# Patient Record
Sex: FEMALE | Race: WHITE | NOT HISPANIC OR LATINO | Employment: FULL TIME | URBAN - METROPOLITAN AREA
[De-identification: names, ages, dates, MRNs, and addresses within clinical notes are randomized per-mention and may not be internally consistent; named-entity substitution may affect disease eponyms.]

---

## 2017-03-17 ENCOUNTER — TRANSCRIBE ORDERS (OUTPATIENT)
Dept: LAB | Facility: CLINIC | Age: 62
End: 2017-03-17

## 2017-03-17 ENCOUNTER — LAB (OUTPATIENT)
Dept: LAB | Facility: CLINIC | Age: 62
End: 2017-03-17
Payer: COMMERCIAL

## 2017-03-17 ENCOUNTER — HOSPITAL ENCOUNTER (OUTPATIENT)
Dept: RADIOLOGY | Facility: CLINIC | Age: 62
Discharge: HOME | End: 2017-03-17
Payer: COMMERCIAL

## 2017-03-17 DIAGNOSIS — E55.9 UNSPECIFIED VITAMIN D DEFICIENCY: ICD-10-CM

## 2017-03-17 DIAGNOSIS — C50.911 MALIGNANT NEOPLASM OF RIGHT FEMALE BREAST, UNSPECIFIED SITE OF BREAST: ICD-10-CM

## 2017-03-17 DIAGNOSIS — C50.911 MALIGNANT NEOPLASM OF RIGHT FEMALE BREAST, UNSPECIFIED SITE OF BREAST: Primary | ICD-10-CM

## 2017-03-17 DIAGNOSIS — E78.5 HYPERLIPIDEMIA, UNSPECIFIED HYPERLIPIDEMIA TYPE: ICD-10-CM

## 2017-03-17 DIAGNOSIS — K21.9 GASTROESOPHAGEAL REFLUX DISEASE WITHOUT ESOPHAGITIS: Primary | ICD-10-CM

## 2017-03-17 DIAGNOSIS — K21.9 GASTROESOPHAGEAL REFLUX DISEASE WITHOUT ESOPHAGITIS: ICD-10-CM

## 2017-03-17 LAB
25(OH)D3 SERPL-MCNC: 44.8 NG/ML (ref 30–100)
ALBUMIN SERPL BCP-MCNC: 3.8 G/DL (ref 3.5–5)
ALP SERPL-CCNC: 97 U/L (ref 46–116)
ALT SERPL W P-5'-P-CCNC: 31 U/L (ref 12–78)
ANION GAP SERPL CALCULATED.3IONS-SCNC: 6 MMOL/L (ref 4–13)
AST SERPL W P-5'-P-CCNC: 21 U/L (ref 5–45)
BACTERIA UR QL AUTO: ABNORMAL /HPF
BASOPHILS # BLD AUTO: 0.01 THOUSANDS/ΜL (ref 0–0.1)
BASOPHILS NFR BLD AUTO: 0 % (ref 0–1)
BILIRUB SERPL-MCNC: 0.4 MG/DL (ref 0.2–1)
BILIRUB UR QL STRIP: NEGATIVE
BUN SERPL-MCNC: 15 MG/DL (ref 5–25)
CALCIUM SERPL-MCNC: 8.7 MG/DL (ref 8.3–10.1)
CANCER AG27-29 SERPL-ACNC: 43.3 U/ML (ref 0–42.3)
CHLORIDE SERPL-SCNC: 105 MMOL/L (ref 100–108)
CHOLEST SERPL-MCNC: 152 MG/DL (ref 50–200)
CLARITY UR: CLEAR
CO2 SERPL-SCNC: 29 MMOL/L (ref 21–32)
COLOR UR: YELLOW
CREAT SERPL-MCNC: 0.92 MG/DL (ref 0.6–1.3)
EOSINOPHIL # BLD AUTO: 0.08 THOUSAND/ΜL (ref 0–0.61)
EOSINOPHIL NFR BLD AUTO: 2 % (ref 0–6)
ERYTHROCYTE [DISTWIDTH] IN BLOOD BY AUTOMATED COUNT: 13.8 % (ref 11.6–15.1)
GFR SERPL CREATININE-BSD FRML MDRD: >60 ML/MIN/1.73SQ M
GLUCOSE P FAST SERPL-MCNC: 92 MG/DL (ref 65–99)
GLUCOSE UR STRIP-MCNC: NEGATIVE MG/DL
HCT VFR BLD AUTO: 38.5 % (ref 34.8–46.1)
HDLC SERPL-MCNC: 56 MG/DL (ref 40–60)
HGB BLD-MCNC: 13 G/DL (ref 11.5–15.4)
HGB UR QL STRIP.AUTO: NEGATIVE
KETONES UR STRIP-MCNC: NEGATIVE MG/DL
LDLC SERPL CALC-MCNC: 57 MG/DL (ref 0–100)
LEUKOCYTE ESTERASE UR QL STRIP: ABNORMAL
LYMPHOCYTES # BLD AUTO: 1.95 THOUSANDS/ΜL (ref 0.6–4.47)
LYMPHOCYTES NFR BLD AUTO: 38 % (ref 14–44)
MCH RBC QN AUTO: 28.8 PG (ref 26.8–34.3)
MCHC RBC AUTO-ENTMCNC: 33.8 G/DL (ref 31.4–37.4)
MCV RBC AUTO: 85 FL (ref 82–98)
MONOCYTES # BLD AUTO: 0.32 THOUSAND/ΜL (ref 0.17–1.22)
MONOCYTES NFR BLD AUTO: 6 % (ref 4–12)
NEUTROPHILS # BLD AUTO: 2.73 THOUSANDS/ΜL (ref 1.85–7.62)
NEUTS SEG NFR BLD AUTO: 54 % (ref 43–75)
NITRITE UR QL STRIP: NEGATIVE
NON-SQ EPI CELLS URNS QL MICRO: ABNORMAL /HPF
PH UR STRIP.AUTO: 5 [PH] (ref 4.5–8)
PLATELET # BLD AUTO: 292 THOUSANDS/UL (ref 149–390)
PMV BLD AUTO: 9.9 FL (ref 8.9–12.7)
POTASSIUM SERPL-SCNC: 4.4 MMOL/L (ref 3.5–5.3)
PROT SERPL-MCNC: 7.1 G/DL (ref 6.4–8.2)
PROT UR STRIP-MCNC: NEGATIVE MG/DL
RBC # BLD AUTO: 4.52 MILLION/UL (ref 3.81–5.12)
RBC #/AREA URNS AUTO: ABNORMAL /HPF
SODIUM SERPL-SCNC: 140 MMOL/L (ref 136–145)
SP GR UR STRIP.AUTO: 1.02 (ref 1–1.03)
TRIGL SERPL-MCNC: 197 MG/DL
TSH SERPL DL<=0.05 MIU/L-ACNC: 2.37 UIU/ML (ref 0.36–3.74)
UROBILINOGEN UR QL STRIP.AUTO: 0.2 E.U./DL
WBC # BLD AUTO: 5.09 THOUSAND/UL (ref 4.31–10.16)
WBC #/AREA URNS AUTO: ABNORMAL /HPF

## 2017-03-17 PROCEDURE — 36415 COLL VENOUS BLD VENIPUNCTURE: CPT

## 2017-03-17 PROCEDURE — 80053 COMPREHEN METABOLIC PANEL: CPT

## 2017-03-17 PROCEDURE — 71020 HB CHEST X-RAY 2VW FRONTAL&LATL: CPT

## 2017-03-17 PROCEDURE — 85025 COMPLETE CBC W/AUTO DIFF WBC: CPT

## 2017-03-17 PROCEDURE — 86300 IMMUNOASSAY TUMOR CA 15-3: CPT

## 2017-03-17 PROCEDURE — 84443 ASSAY THYROID STIM HORMONE: CPT

## 2017-03-17 PROCEDURE — 81001 URINALYSIS AUTO W/SCOPE: CPT

## 2017-03-17 PROCEDURE — 80061 LIPID PANEL: CPT

## 2017-03-17 PROCEDURE — 82306 VITAMIN D 25 HYDROXY: CPT

## 2017-03-30 ENCOUNTER — TRANSCRIBE ORDERS (OUTPATIENT)
Dept: ADMINISTRATIVE | Facility: HOSPITAL | Age: 62
End: 2017-03-30

## 2017-03-30 DIAGNOSIS — R91.1 LUNG NODULE: Primary | ICD-10-CM

## 2017-04-07 ENCOUNTER — HOSPITAL ENCOUNTER (OUTPATIENT)
Dept: CT IMAGING | Facility: HOSPITAL | Age: 62
Discharge: HOME | End: 2017-04-07
Payer: COMMERCIAL

## 2017-04-07 DIAGNOSIS — R91.1 LUNG NODULE: ICD-10-CM

## 2017-04-07 PROCEDURE — 71260 CT THORAX DX C+: CPT

## 2017-04-07 RX ADMIN — IOHEXOL 85 ML: 350 INJECTION, SOLUTION INTRAVENOUS at 06:47

## 2017-05-19 ENCOUNTER — ALLSCRIPTS OFFICE VISIT (OUTPATIENT)
Dept: OTHER | Facility: OTHER | Age: 62
End: 2017-05-19

## 2017-11-04 ENCOUNTER — ALLSCRIPTS OFFICE VISIT (OUTPATIENT)
Dept: OTHER | Facility: OTHER | Age: 62
End: 2017-11-04

## 2017-11-08 LAB
HPV 18 (HISTORICAL): NOT DETECTED
HPV HIGH RISK 16/18 (HISTORICAL): NOT DETECTED
HPV16 (HISTORICAL): NOT DETECTED
PAP (HISTORICAL): NORMAL

## 2018-01-13 VITALS
BODY MASS INDEX: 24.66 KG/M2 | RESPIRATION RATE: 16 BRPM | HEART RATE: 100 BPM | TEMPERATURE: 98.6 F | DIASTOLIC BLOOD PRESSURE: 76 MMHG | OXYGEN SATURATION: 100 % | HEIGHT: 62 IN | SYSTOLIC BLOOD PRESSURE: 112 MMHG | WEIGHT: 134 LBS

## 2018-01-14 VITALS
HEIGHT: 63 IN | DIASTOLIC BLOOD PRESSURE: 80 MMHG | SYSTOLIC BLOOD PRESSURE: 138 MMHG | WEIGHT: 136 LBS | BODY MASS INDEX: 24.1 KG/M2

## 2018-03-06 ENCOUNTER — TRANSCRIBE ORDERS (OUTPATIENT)
Dept: ADMINISTRATIVE | Facility: HOSPITAL | Age: 63
End: 2018-03-06

## 2018-03-06 DIAGNOSIS — M25.512 LEFT SHOULDER PAIN, UNSPECIFIED CHRONICITY: Primary | ICD-10-CM

## 2018-03-16 ENCOUNTER — HOSPITAL ENCOUNTER (OUTPATIENT)
Dept: MRI IMAGING | Facility: HOSPITAL | Age: 63
Discharge: HOME | End: 2018-03-16
Payer: COMMERCIAL

## 2018-03-16 DIAGNOSIS — M25.512 LEFT SHOULDER PAIN, UNSPECIFIED CHRONICITY: ICD-10-CM

## 2018-03-16 PROCEDURE — 73221 MRI JOINT UPR EXTREM W/O DYE: CPT

## 2018-04-06 ENCOUNTER — TRANSCRIBE ORDERS (OUTPATIENT)
Dept: LAB | Facility: CLINIC | Age: 63
End: 2018-04-06

## 2018-04-06 ENCOUNTER — APPOINTMENT (OUTPATIENT)
Dept: RADIOLOGY | Facility: CLINIC | Age: 63
End: 2018-04-06
Payer: COMMERCIAL

## 2018-04-06 ENCOUNTER — APPOINTMENT (OUTPATIENT)
Dept: LAB | Facility: CLINIC | Age: 63
End: 2018-04-06
Payer: COMMERCIAL

## 2018-04-06 DIAGNOSIS — E55.9 AVITAMINOSIS D: Primary | ICD-10-CM

## 2018-04-06 DIAGNOSIS — Z13.9 SCREENING FOR CONDITION: ICD-10-CM

## 2018-04-06 DIAGNOSIS — C50.911 MALIGNANT NEOPLASM OF RIGHT FEMALE BREAST, UNSPECIFIED ESTROGEN RECEPTOR STATUS, UNSPECIFIED SITE OF BREAST (HCC): Primary | ICD-10-CM

## 2018-04-06 DIAGNOSIS — C50.911 MALIGNANT NEOPLASM OF RIGHT FEMALE BREAST, UNSPECIFIED ESTROGEN RECEPTOR STATUS, UNSPECIFIED SITE OF BREAST (HCC): ICD-10-CM

## 2018-04-06 DIAGNOSIS — E55.9 AVITAMINOSIS D: ICD-10-CM

## 2018-04-06 LAB
25(OH)D3 SERPL-MCNC: 72.4 NG/ML (ref 30–100)
ALBUMIN SERPL BCP-MCNC: 3.9 G/DL (ref 3.5–5)
ALP SERPL-CCNC: 103 U/L (ref 46–116)
ALT SERPL W P-5'-P-CCNC: 50 U/L (ref 12–78)
ANION GAP SERPL CALCULATED.3IONS-SCNC: 9 MMOL/L (ref 4–13)
AST SERPL W P-5'-P-CCNC: 26 U/L (ref 5–45)
BACTERIA UR QL AUTO: ABNORMAL /HPF
BILIRUB SERPL-MCNC: 0.6 MG/DL (ref 0.2–1)
BILIRUB UR QL STRIP: NEGATIVE
BUN SERPL-MCNC: 24 MG/DL (ref 5–25)
CALCIUM SERPL-MCNC: 8.9 MG/DL (ref 8.3–10.1)
CANCER AG27-29 SERPL-ACNC: 45.2 U/ML (ref 0–42.3)
CHLORIDE SERPL-SCNC: 104 MMOL/L (ref 100–108)
CHOLEST SERPL-MCNC: 166 MG/DL (ref 50–200)
CLARITY UR: CLEAR
CO2 SERPL-SCNC: 28 MMOL/L (ref 21–32)
COLOR UR: YELLOW
CREAT SERPL-MCNC: 0.86 MG/DL (ref 0.6–1.3)
ERYTHROCYTE [DISTWIDTH] IN BLOOD BY AUTOMATED COUNT: 13.7 % (ref 11.6–15.1)
GFR SERPL CREATININE-BSD FRML MDRD: 73 ML/MIN/1.73SQ M
GLUCOSE P FAST SERPL-MCNC: 93 MG/DL (ref 65–99)
GLUCOSE UR STRIP-MCNC: NEGATIVE MG/DL
HCT VFR BLD AUTO: 38.7 % (ref 34.8–46.1)
HDLC SERPL-MCNC: 49 MG/DL (ref 40–60)
HGB BLD-MCNC: 12.9 G/DL (ref 11.5–15.4)
HGB UR QL STRIP.AUTO: NEGATIVE
KETONES UR STRIP-MCNC: NEGATIVE MG/DL
LDLC SERPL CALC-MCNC: 92 MG/DL (ref 0–100)
LEUKOCYTE ESTERASE UR QL STRIP: ABNORMAL
MCH RBC QN AUTO: 28.4 PG (ref 26.8–34.3)
MCHC RBC AUTO-ENTMCNC: 33.3 G/DL (ref 31.4–37.4)
MCV RBC AUTO: 85 FL (ref 82–98)
NITRITE UR QL STRIP: NEGATIVE
NON-SQ EPI CELLS URNS QL MICRO: ABNORMAL /HPF
NONHDLC SERPL-MCNC: 117 MG/DL
OTHER STN SPEC: ABNORMAL
PH UR STRIP.AUTO: 6 [PH] (ref 4.5–8)
PLATELET # BLD AUTO: 310 THOUSANDS/UL (ref 149–390)
PMV BLD AUTO: 9.6 FL (ref 8.9–12.7)
POTASSIUM SERPL-SCNC: 3.8 MMOL/L (ref 3.5–5.3)
PROT SERPL-MCNC: 7.2 G/DL (ref 6.4–8.2)
PROT UR STRIP-MCNC: NEGATIVE MG/DL
RBC # BLD AUTO: 4.55 MILLION/UL (ref 3.81–5.12)
RBC #/AREA URNS AUTO: ABNORMAL /HPF
SODIUM SERPL-SCNC: 141 MMOL/L (ref 136–145)
SP GR UR STRIP.AUTO: 1.01 (ref 1–1.03)
TRIGL SERPL-MCNC: 123 MG/DL
TSH SERPL DL<=0.05 MIU/L-ACNC: 2.51 UIU/ML (ref 0.36–3.74)
UROBILINOGEN UR QL STRIP.AUTO: 0.2 E.U./DL
WBC # BLD AUTO: 5.22 THOUSAND/UL (ref 4.31–10.16)
WBC #/AREA URNS AUTO: ABNORMAL /HPF

## 2018-04-06 PROCEDURE — 86300 IMMUNOASSAY TUMOR CA 15-3: CPT

## 2018-04-06 PROCEDURE — 36415 COLL VENOUS BLD VENIPUNCTURE: CPT

## 2018-04-06 PROCEDURE — 80053 COMPREHEN METABOLIC PANEL: CPT

## 2018-04-06 PROCEDURE — 81001 URINALYSIS AUTO W/SCOPE: CPT | Performed by: FAMILY MEDICINE

## 2018-04-06 PROCEDURE — 80061 LIPID PANEL: CPT

## 2018-04-06 PROCEDURE — 82306 VITAMIN D 25 HYDROXY: CPT

## 2018-04-06 PROCEDURE — 85027 COMPLETE CBC AUTOMATED: CPT

## 2018-04-06 PROCEDURE — 84443 ASSAY THYROID STIM HORMONE: CPT

## 2018-04-06 PROCEDURE — 71046 X-RAY EXAM CHEST 2 VIEWS: CPT

## 2018-05-18 ENCOUNTER — OFFICE VISIT (OUTPATIENT)
Dept: HEMATOLOGY ONCOLOGY | Facility: CLINIC | Age: 63
End: 2018-05-18
Payer: COMMERCIAL

## 2018-05-18 VITALS
OXYGEN SATURATION: 97 % | HEART RATE: 97 BPM | HEIGHT: 63 IN | RESPIRATION RATE: 16 BRPM | SYSTOLIC BLOOD PRESSURE: 120 MMHG | WEIGHT: 136.4 LBS | TEMPERATURE: 98.1 F | DIASTOLIC BLOOD PRESSURE: 78 MMHG | BODY MASS INDEX: 24.17 KG/M2

## 2018-05-18 DIAGNOSIS — C50.911 MALIGNANT NEOPLASM OF RIGHT BREAST IN FEMALE, ESTROGEN RECEPTOR POSITIVE, UNSPECIFIED SITE OF BREAST (HCC): Primary | ICD-10-CM

## 2018-05-18 DIAGNOSIS — Z17.0 MALIGNANT NEOPLASM OF RIGHT BREAST IN FEMALE, ESTROGEN RECEPTOR POSITIVE, UNSPECIFIED SITE OF BREAST (HCC): Primary | ICD-10-CM

## 2018-05-18 PROCEDURE — 99213 OFFICE O/P EST LOW 20 MIN: CPT | Performed by: INTERNAL MEDICINE

## 2018-05-18 RX ORDER — MULTIVITAMIN/IRON/FOLIC ACID 18MG-0.4MG
TABLET ORAL
COMMUNITY
End: 2019-01-01 | Stop reason: ALTCHOICE

## 2018-05-18 RX ORDER — ATORVASTATIN CALCIUM 10 MG/1
TABLET, FILM COATED ORAL
COMMUNITY
End: 2020-01-01 | Stop reason: HOSPADM

## 2018-05-18 RX ORDER — ACETAMINOPHEN 500 MG
TABLET ORAL
COMMUNITY
End: 2019-01-01 | Stop reason: ALTCHOICE

## 2018-05-18 RX ORDER — BUSPIRONE HYDROCHLORIDE 15 MG/1
15 TABLET ORAL DAILY
Refills: 3 | COMMUNITY
Start: 2018-03-24 | End: 2020-01-01 | Stop reason: HOSPADM

## 2018-05-18 RX ORDER — ANASTROZOLE 1 MG/1
1 TABLET ORAL DAILY
COMMUNITY
End: 2018-05-22 | Stop reason: SDUPTHER

## 2018-05-22 DIAGNOSIS — Z17.0 MALIGNANT NEOPLASM OF BREAST IN FEMALE, ESTROGEN RECEPTOR POSITIVE, UNSPECIFIED LATERALITY, UNSPECIFIED SITE OF BREAST (HCC): Primary | ICD-10-CM

## 2018-05-22 DIAGNOSIS — C50.919 MALIGNANT NEOPLASM OF BREAST IN FEMALE, ESTROGEN RECEPTOR POSITIVE, UNSPECIFIED LATERALITY, UNSPECIFIED SITE OF BREAST (HCC): Primary | ICD-10-CM

## 2018-05-22 RX ORDER — ANASTROZOLE 1 MG/1
TABLET ORAL
Qty: 30 TABLET | Refills: 11 | Status: SHIPPED | OUTPATIENT
Start: 2018-05-22 | End: 2019-01-01 | Stop reason: SDUPTHER

## 2018-07-27 ENCOUNTER — OFFICE VISIT (OUTPATIENT)
Dept: HEMATOLOGY ONCOLOGY | Facility: CLINIC | Age: 63
End: 2018-07-27
Payer: COMMERCIAL

## 2018-07-27 VITALS
RESPIRATION RATE: 16 BRPM | WEIGHT: 138 LBS | SYSTOLIC BLOOD PRESSURE: 138 MMHG | BODY MASS INDEX: 24.45 KG/M2 | OXYGEN SATURATION: 97 % | TEMPERATURE: 98.1 F | DIASTOLIC BLOOD PRESSURE: 84 MMHG | HEART RATE: 94 BPM | HEIGHT: 63 IN

## 2018-07-27 DIAGNOSIS — Z17.0 MALIGNANT NEOPLASM OF RIGHT BREAST IN FEMALE, ESTROGEN RECEPTOR POSITIVE, UNSPECIFIED SITE OF BREAST (HCC): Primary | ICD-10-CM

## 2018-07-27 DIAGNOSIS — C50.911 MALIGNANT NEOPLASM OF RIGHT BREAST IN FEMALE, ESTROGEN RECEPTOR POSITIVE, UNSPECIFIED SITE OF BREAST (HCC): Primary | ICD-10-CM

## 2018-07-27 PROCEDURE — 99213 OFFICE O/P EST LOW 20 MIN: CPT | Performed by: INTERNAL MEDICINE

## 2018-07-27 RX ORDER — MELOXICAM 7.5 MG/1
7.5 TABLET ORAL DAILY
COMMUNITY
End: 2019-01-01 | Stop reason: ALTCHOICE

## 2018-11-17 ENCOUNTER — ANNUAL EXAM (OUTPATIENT)
Dept: OBGYN CLINIC | Facility: CLINIC | Age: 63
End: 2018-11-17
Payer: COMMERCIAL

## 2018-11-17 VITALS
DIASTOLIC BLOOD PRESSURE: 72 MMHG | WEIGHT: 138 LBS | HEIGHT: 63 IN | BODY MASS INDEX: 24.45 KG/M2 | SYSTOLIC BLOOD PRESSURE: 122 MMHG

## 2018-11-17 DIAGNOSIS — Z01.419 ENCOUNTER FOR GYNECOLOGICAL EXAMINATION WITHOUT ABNORMAL FINDING: Primary | ICD-10-CM

## 2018-11-17 PROCEDURE — S0612 ANNUAL GYNECOLOGICAL EXAMINA: HCPCS | Performed by: OBSTETRICS & GYNECOLOGY

## 2018-11-17 NOTE — PATIENT INSTRUCTIONS
Wellness Visit for Adults   AMBULATORY CARE:   A wellness visit  is when you see your healthcare provider to get screened for health problems  You can also get advice on how to stay healthy  Write down your questions so you remember to ask them  Ask your healthcare provider how often you should have a wellness visit  What happens at a wellness visit:  Your healthcare provider will ask about your health, and your family history of health problems  This includes high blood pressure, heart disease, and cancer  He or she will ask if you have symptoms that concern you, if you smoke, and about your mood  You may also be asked about your intake of medicines, supplements, food, and alcohol  Any of the following may be done:  · Your weight  will be checked  Your height may also be checked so your body mass index (BMI) can be calculated  Your BMI shows if you are at a healthy weight  · Your blood pressure  and heart rate will be checked  Your temperature may also be checked  · Blood and urine tests  may be done  Blood tests may be done to check your cholesterol levels  Abnormal cholesterol levels increase your risk for heart disease and stroke  You may also need a blood or urine test to check for diabetes if you are at increased risk  Urine tests may be done to look for signs of an infection or kidney disease  · A physical exam  includes checking your heartbeat and lungs with a stethoscope  Your healthcare provider may also check your skin to look for sun damage  · Screening tests  may be recommended  A screening test is done to check for diseases that may not cause symptoms  The screening tests you may need depend on your age, gender, family history, and lifestyle habits  For example, colorectal screening may be recommended if you are 48years old or older  Screening tests you need if you are a woman:   · A Pap smear  is used to screen for cervical cancer   Pap smears are usually done every 3 to 5 years depending on your age  You may need them more often if you have had abnormal Pap smear test results in the past  Ask your healthcare provider how often you should have a Pap smear  · A mammogram  is an x-ray of your breasts to screen for breast cancer  Experts recommend mammograms every 2 years starting at age 48 years  You may need a mammogram at age 52 years or younger if you have an increased risk for breast cancer  Talk to your healthcare provider about when you should start having mammograms and how often you need them  Vaccines you may need:   · Get an influenza vaccine  every year  The influenza vaccine protects you from the flu  Several types of viruses cause the flu  The viruses change over time, so new vaccines are made each year  · Get a tetanus-diphtheria (Td) booster vaccine  every 10 years  This vaccine protects you against tetanus and diphtheria  Tetanus is a severe infection that may cause painful muscle spasms and lockjaw  Diphtheria is a severe bacterial infection that causes a thick covering in the back of your mouth and throat  · Get a human papillomavirus (HPV) vaccine  if you are female and aged 23 to 32 or male 23 to 24 and never received it  This vaccine protects you from HPV infection  HPV is the most common infection spread by sexual contact  HPV may also cause vaginal, penile, and anal cancers  · Get a pneumococcal vaccine  if you are aged 72 years or older  The pneumococcal vaccine is an injection given to protect you from pneumococcal disease  Pneumococcal disease is an infection caused by pneumococcal bacteria  The infection may cause pneumonia, meningitis, or an ear infection  · Get a shingles vaccine  if you are aged 61 or older, even if you have had shingles before  The shingles vaccine is an injection to protect you from the varicella-zoster virus  This is the same virus that causes chickenpox   Shingles is a painful rash that develops in people who had chickenpox or have been exposed to the virus  How to eat healthy:  My Plate is a model for planning healthy meals  It shows the types and amounts of foods that should go on your plate  Fruits and vegetables make up about half of your plate, and grains and protein make up the other half  A serving of dairy is included on the side of your plate  The amount of calories and serving sizes you need depends on your age, gender, weight, and height  Examples of healthy foods are listed below:  · Eat a variety of vegetables  such as dark green, red, and orange vegetables  You can also include canned vegetables low in sodium (salt) and frozen vegetables without added butter or sauces  · Eat a variety of fresh fruits , canned fruit in 100% juice, frozen fruit, and dried fruit  · Include whole grains  At least half of the grains you eat should be whole grains  Examples include whole-wheat bread, wheat pasta, brown rice, and whole-grain cereals such as oatmeal     · Eat a variety of protein foods such as seafood (fish and shellfish), lean meat, and poultry without skin (turkey and chicken)  Examples of lean meats include pork leg, shoulder, or tenderloin, and beef round, sirloin, tenderloin, and extra lean ground beef  Other protein foods include eggs and egg substitutes, beans, peas, soy products, nuts, and seeds  · Choose low-fat dairy products such as skim or 1% milk or low-fat yogurt, cheese, and cottage cheese  · Limit unhealthy fats  such as butter, hard margarine, and shortening  Exercise:  Exercise at least 30 minutes per day on most days of the week  Some examples of exercise include walking, biking, dancing, and swimming  You can also fit in more physical activity by taking the stairs instead of the elevator or parking farther away from stores  Include muscle strengthening activities 2 days each week  Regular exercise provides many health benefits   It helps you manage your weight, and decreases your risk for type 2 diabetes, heart disease, stroke, and high blood pressure  Exercise can also help improve your mood  Ask your healthcare provider about the best exercise plan for you  General health and safety guidelines:   · Do not smoke  Nicotine and other chemicals in cigarettes and cigars can cause lung damage  Ask your healthcare provider for information if you currently smoke and need help to quit  E-cigarettes or smokeless tobacco still contain nicotine  Talk to your healthcare provider before you use these products  · Limit alcohol  A drink of alcohol is 12 ounces of beer, 5 ounces of wine, or 1½ ounces of liquor  · Lose weight, if needed  Being overweight increases your risk of certain health conditions  These include heart disease, high blood pressure, type 2 diabetes, and certain types of cancer  · Protect your skin  Do not sunbathe or use tanning beds  Use sunscreen with a SPF 15 or higher  Apply sunscreen at least 15 minutes before you go outside  Reapply sunscreen every 2 hours  Wear protective clothing, hats, and sunglasses when you are outside  · Drive safely  Always wear your seatbelt  Make sure everyone in your car wears a seatbelt  A seatbelt can save your life if you are in an accident  Do not use your cell phone when you are driving  This could distract you and cause an accident  Pull over if you need to make a call or send a text message  · Practice safe sex  Use latex condoms if are sexually active and have more than one partner  Your healthcare provider may recommend screening tests for sexually transmitted infections (STIs)  · Wear helmets, lifejackets, and protective gear  Always wear a helmet when you ride a bike or motorcycle, go skiing, or play sports that could cause a head injury  Wear protective equipment when you play sports  Wear a lifejacket when you are on a boat or doing water sports    © 2017 [address] [employer] Information is for End User's use only and may not be sold, redistributed or otherwise used for commercial purposes  All illustrations and images included in CareNotes® are the copyrighted property of A D A M , Inc  or Mamadou Calderon  The above information is an  only  It is not intended as medical advice for individual conditions or treatments  Talk to your doctor, nurse or pharmacist before following any medical regimen to see if it is safe and effective for you  Thank you for enrolling in [facility]  Please follow the instructions below to securely access your online medical record  wiseri allows you to send messages to your doctor, view your test results, renew your prescriptions, schedule appointments, and more  [facility] uses Single Sign on (SSO) Technology for you to log in and access our [facility], including wiseri  No more remembering multiple user names and passwords! We are going to guide you through, step by step, to help you set up your [employer] account which will provide access to your [employer] account  How Do I Sign Up? 1  In your Internet browser, go to [email or web address]       2  Click on the   [employer] patient account and then click Dont have an                 Account? Create one now      3  Enter your demographic information and chose a user name (email address) and password  Think of one that is secure and easy to remember  Enter a Referral code if you have one (this is not your [employer] code ) Accept the Terms and Conditions and the Privacy Policy  4  Select your security questions that you will use to reset your password should you forget it  Click Submit  5  Enter your wiseri Activation Code exactly as it appears below  You will not need to use this code after you have completed the sign-up process  If you do not sign up before the expiration date, you must request a new code                           wiseri Activation Code: [email or web address]  Expires: 12/1/2018  9:55 AM    6  Confirm your email address  An email confirmation was sent to you  Please open that email and click Confirm your Email   You should then be redirected to our [facility] Single sign on page, where you will log on with the user name and password you created! Proceed to the [employer] Icon to view your personal health information  Additional Information  If you have questions, you can e-mail patient  [email or web address] or call [phone] to talk to our customer support staff  Remember, [employer] is NOT to be used for urgent needs  For medical emergencies, dial 911

## 2018-11-17 NOTE — PROGRESS NOTES
Assessment/Plan:    Normal annual gynecological exam  Pap smear done last year was normal with negative Co testing, D for this year  Continue to follow up with the breast surgeon and oncologist for the history of breast cancer       Problem List Items Addressed This Visit     Encounter for gynecological examination without abnormal finding - Primary            Subjective:      Patient ID: Selena Hernandez is a 59 y o  female  Here for annual exam - overall well - no vaginal bleeding or discharge - bowels and bladder normal and recent colonoscopy - still with f/u with breast oncologist and surgeon for breast cancer - up to date with blood work - dexascan 2016 and f/u with PCP         The following portions of the patient's history were reviewed and updated as appropriate:   She  has a past medical history of Breast CA ([facility] [address] ); GERD (gastroesophageal reflux disease); and Hyperlipidemia  She   Patient Active Problem List    Diagnosis Date Noted    Encounter for gynecological examination without abnormal finding 11/17/2018    Malignant neoplasm of right breast in female, estrogen receptor positive ([facility] [address] ) 05/18/2018     She  has a past surgical history that includes Mastectomy (Bilateral); Reconstruction breast w/ tram flap (Right); and Breast biopsy  Her family history includes Colon cancer in her sister; Diabetes in her father and mother; Heart disease in her father and mother; Hypertension in her mother and sister  She  reports that she has never smoked  She has never used smokeless tobacco  She reports that she does not use drugs  Her alcohol history is not on file    Current Outpatient Prescriptions   Medication Sig Dispense Refill    anastrozole (ARIMIDEX) 1 mg tablet TAKE ONE TABLET BY MOUTH EVERY DAY 30 tablet 11    atorvastatin (LIPITOR) 10 mg tablet Take by mouth      busPIRone (BUSPAR) 15 mg tablet Take 15 mg by mouth daily  3    calcium carbonate 1250 MG capsule Take 1,250 mg by mouth 2 (two) times a day with meals      Calcium Carbonate-Vit D-Min (CALCIUM 1200) 3469-3874 MG-UNIT CHEW Chew      DAILY MULTIPLE VITAMINS/IRON TABS Take by mouth      meloxicam (MOBIC) 7 5 mg tablet Take 7 5 mg by mouth daily       No current facility-administered medications for this visit  Current Outpatient Prescriptions on File Prior to Visit   Medication Sig    anastrozole (ARIMIDEX) 1 mg tablet TAKE ONE TABLET BY MOUTH EVERY DAY    atorvastatin (LIPITOR) 10 mg tablet Take by mouth    busPIRone (BUSPAR) 15 mg tablet Take 15 mg by mouth daily    calcium carbonate 1250 MG capsule Take 1,250 mg by mouth 2 (two) times a day with meals    Calcium Carbonate-Vit D-Min (CALCIUM 1200) 8816-3045 MG-UNIT CHEW Chew    DAILY MULTIPLE VITAMINS/IRON TABS Take by mouth    meloxicam (MOBIC) 7 5 mg tablet Take 7 5 mg by mouth daily     No current facility-administered medications on file prior to visit  She is allergic to percocet [oxycodone-acetaminophen] and vancomycin       Review of Systems   Constitutional: Negative for fatigue, fever and unexpected weight change  HENT: Negative for dental problem, mouth sores, nosebleeds, rhinorrhea, sinus pain, sinus pressure and sore throat  Eyes: Negative for pain, discharge and visual disturbance  Respiratory: Negative for cough, chest tightness, shortness of breath and wheezing  Cardiovascular: Negative for chest pain, palpitations and leg swelling  Gastrointestinal: Negative for blood in stool, constipation, diarrhea, nausea and vomiting  Endocrine: Negative for polydipsia  Genitourinary: Negative for difficulty urinating, dyspareunia, dysuria, menstrual problem, pelvic pain, urgency, vaginal discharge and vaginal pain  Musculoskeletal: Negative for arthralgias, back pain and joint swelling  Allergic/Immunologic: Negative for environmental allergies  Neurological: Negative for seizures, light-headedness and headaches     Hematological: Does not bruise/bleed easily  Psychiatric/Behavioral: Negative for sleep disturbance  The patient is not nervous/anxious  All other systems reviewed and are negative  Objective:      LMP 01/01/2011 (Approximate)          Physical Exam   Constitutional: She is oriented to person, place, and time  She appears well-developed and well-nourished  white female    HENT:   Head: Normocephalic and atraumatic  Neck: Normal range of motion  Neck supple  No thyromegaly present  Cardiovascular: Normal rate and regular rhythm  Pulmonary/Chest: Effort normal and breath sounds normal  No respiratory distress  She has no wheezes  She has no rales  She exhibits no tenderness  Breasts are status post mastectomy with augmentation of the right breast which is soft and nontender and well healed with some axillary lymphedema, the left breast is surgically absent with the incision well-healed and no palpable masses  The exam is nontender for both breast areas   Abdominal: Soft  She exhibits no distension and no mass  There is no tenderness  There is no rebound and no guarding  Genitourinary:   Genitourinary Comments: Normal female, no vulvar or vaginal lesions, vaginal atrophy and some redundancy is noted, cervix is grossly normal appearance, uterus is anterior, normal in size, shape and mobility, there are no adnexal masses the exam is nontender  Rectal exam reveals normal sphincter tone, no palpable masses and stool is guaiac negative   Neurological: She is alert and oriented to person, place, and time  Psychiatric: She has a normal mood and affect  Her behavior is normal    Vitals reviewed

## 2019-01-01 ENCOUNTER — TELEPHONE (OUTPATIENT)
Dept: HEMATOLOGY ONCOLOGY | Facility: CLINIC | Age: 64
End: 2019-01-01

## 2019-01-01 ENCOUNTER — DOCUMENTATION (OUTPATIENT)
Dept: HEMATOLOGY ONCOLOGY | Facility: CLINIC | Age: 64
End: 2019-01-01

## 2019-01-01 ENCOUNTER — TRANSCRIBE ORDERS (OUTPATIENT)
Dept: LAB | Facility: CLINIC | Age: 64
End: 2019-01-01

## 2019-01-01 ENCOUNTER — HOSPITAL ENCOUNTER (OUTPATIENT)
Dept: NUCLEAR MEDICINE | Facility: HOSPITAL | Age: 64
Discharge: HOME | End: 2019-11-15
Attending: INTERNAL MEDICINE
Payer: COMMERCIAL

## 2019-01-01 ENCOUNTER — HOSPITAL ENCOUNTER (OUTPATIENT)
Dept: INFUSION CENTER | Facility: CLINIC | Age: 64
Discharge: HOME | End: 2019-12-26
Payer: COMMERCIAL

## 2019-01-01 ENCOUNTER — APPOINTMENT (OUTPATIENT)
Dept: CT IMAGING | Facility: HOSPITAL | Age: 64
End: 2019-01-01
Attending: INTERNAL MEDICINE
Payer: COMMERCIAL

## 2019-01-01 ENCOUNTER — APPOINTMENT (OUTPATIENT)
Dept: LAB | Facility: CLINIC | Age: 64
End: 2019-01-01
Payer: COMMERCIAL

## 2019-01-01 ENCOUNTER — OFFICE VISIT (OUTPATIENT)
Dept: HEMATOLOGY ONCOLOGY | Facility: CLINIC | Age: 64
End: 2019-01-01
Payer: COMMERCIAL

## 2019-01-01 ENCOUNTER — HOSPITAL ENCOUNTER (OUTPATIENT)
Dept: NUCLEAR MEDICINE | Facility: HOSPITAL | Age: 64
Discharge: HOME | End: 2019-05-09
Attending: INTERNAL MEDICINE
Payer: COMMERCIAL

## 2019-01-01 ENCOUNTER — HOSPITAL ENCOUNTER (OUTPATIENT)
Dept: INFUSION CENTER | Facility: CLINIC | Age: 64
Discharge: HOME | End: 2019-12-27
Payer: COMMERCIAL

## 2019-01-01 ENCOUNTER — HOSPITAL ENCOUNTER (OUTPATIENT)
Dept: INFUSION CENTER | Facility: CLINIC | Age: 64
Discharge: HOME | End: 2019-12-18
Payer: COMMERCIAL

## 2019-01-01 ENCOUNTER — HOSPITAL ENCOUNTER (OUTPATIENT)
Dept: RADIOLOGY | Facility: HOSPITAL | Age: 64
Discharge: HOME | End: 2019-04-20
Attending: SURGERY
Payer: COMMERCIAL

## 2019-01-01 ENCOUNTER — TRANSCRIBE ORDERS (OUTPATIENT)
Dept: ADMINISTRATIVE | Facility: HOSPITAL | Age: 64
End: 2019-01-01

## 2019-01-01 VITALS
TEMPERATURE: 97.7 F | SYSTOLIC BLOOD PRESSURE: 126 MMHG | OXYGEN SATURATION: 98 % | HEART RATE: 132 BPM | WEIGHT: 137 LBS | RESPIRATION RATE: 18 BRPM | DIASTOLIC BLOOD PRESSURE: 68 MMHG | HEIGHT: 63 IN | BODY MASS INDEX: 24.27 KG/M2

## 2019-01-01 VITALS
OXYGEN SATURATION: 97 % | HEIGHT: 63 IN | BODY MASS INDEX: 24.45 KG/M2 | TEMPERATURE: 97.5 F | WEIGHT: 138 LBS | RESPIRATION RATE: 18 BRPM | SYSTOLIC BLOOD PRESSURE: 128 MMHG | DIASTOLIC BLOOD PRESSURE: 90 MMHG | HEART RATE: 102 BPM

## 2019-01-01 VITALS
RESPIRATION RATE: 16 BRPM | DIASTOLIC BLOOD PRESSURE: 80 MMHG | OXYGEN SATURATION: 95 % | TEMPERATURE: 99.2 F | HEART RATE: 109 BPM | SYSTOLIC BLOOD PRESSURE: 142 MMHG

## 2019-01-01 VITALS
TEMPERATURE: 97.7 F | SYSTOLIC BLOOD PRESSURE: 122 MMHG | DIASTOLIC BLOOD PRESSURE: 74 MMHG | OXYGEN SATURATION: 94 % | RESPIRATION RATE: 18 BRPM | HEART RATE: 110 BPM

## 2019-01-01 DIAGNOSIS — C50.911 MALIGNANT NEOPLASM OF RIGHT BREAST IN FEMALE, ESTROGEN RECEPTOR POSITIVE, UNSPECIFIED SITE OF BREAST (HCC): ICD-10-CM

## 2019-01-01 DIAGNOSIS — T45.1X5A CHEMOTHERAPY INDUCED NAUSEA AND VOMITING: ICD-10-CM

## 2019-01-01 DIAGNOSIS — Z17.0 MALIGNANT NEOPLASM OF RIGHT BREAST IN FEMALE, ESTROGEN RECEPTOR POSITIVE, UNSPECIFIED SITE OF BREAST (HCC): ICD-10-CM

## 2019-01-01 DIAGNOSIS — C78.7 LIVER METASTASIS (HCC): ICD-10-CM

## 2019-01-01 DIAGNOSIS — C50.911 MALIGNANT NEOPLASM OF RIGHT BREAST IN FEMALE, ESTROGEN RECEPTOR POSITIVE, UNSPECIFIED SITE OF BREAST (HCC): Primary | ICD-10-CM

## 2019-01-01 DIAGNOSIS — C50.919 MALIGNANT NEOPLASM OF BREAST IN FEMALE, ESTROGEN RECEPTOR POSITIVE, UNSPECIFIED LATERALITY, UNSPECIFIED SITE OF BREAST (HCC): ICD-10-CM

## 2019-01-01 DIAGNOSIS — Z17.0 MALIGNANT NEOPLASM OF RIGHT BREAST IN FEMALE, ESTROGEN RECEPTOR POSITIVE, UNSPECIFIED SITE OF BREAST (HCC): Primary | ICD-10-CM

## 2019-01-01 DIAGNOSIS — C50.911 MALIGNANT NEOPLASM OF RIGHT FEMALE BREAST, UNSPECIFIED ESTROGEN RECEPTOR STATUS, UNSPECIFIED SITE OF BREAST (HCC): Primary | ICD-10-CM

## 2019-01-01 DIAGNOSIS — Z17.0 MALIGNANT NEOPLASM OF BREAST IN FEMALE, ESTROGEN RECEPTOR POSITIVE, UNSPECIFIED LATERALITY, UNSPECIFIED SITE OF BREAST (HCC): ICD-10-CM

## 2019-01-01 DIAGNOSIS — C78.7 LIVER METASTASIS (HCC): Primary | ICD-10-CM

## 2019-01-01 DIAGNOSIS — E83.52 CALCIUM BLOOD INCREASED: ICD-10-CM

## 2019-01-01 DIAGNOSIS — C50.911 MALIGNANT NEOPLASM OF RIGHT FEMALE BREAST, UNSPECIFIED ESTROGEN RECEPTOR STATUS, UNSPECIFIED SITE OF BREAST (HCC): ICD-10-CM

## 2019-01-01 DIAGNOSIS — E83.52 CALCIUM BLOOD INCREASED: Primary | ICD-10-CM

## 2019-01-01 DIAGNOSIS — R11.2 CHEMOTHERAPY INDUCED NAUSEA AND VOMITING: ICD-10-CM

## 2019-01-01 DIAGNOSIS — I10 ESSENTIAL HYPERTENSION, MALIGNANT: Primary | ICD-10-CM

## 2019-01-01 DIAGNOSIS — E55.9 AVITAMINOSIS D: ICD-10-CM

## 2019-01-01 DIAGNOSIS — E78.00 PURE HYPERCHOLESTEROLEMIA: ICD-10-CM

## 2019-01-01 DIAGNOSIS — I10 ESSENTIAL HYPERTENSION, MALIGNANT: ICD-10-CM

## 2019-01-01 DIAGNOSIS — C50.511 MALIGNANT NEOPLASM OF LOWER-OUTER QUADRANT OF RIGHT FEMALE BREAST, UNSPECIFIED ESTROGEN RECEPTOR STATUS (HCC): ICD-10-CM

## 2019-01-01 LAB
25(OH)D3 SERPL-MCNC: 57.1 NG/ML (ref 30–100)
ALBUMIN SERPL BCP-MCNC: 2.4 G/DL (ref 3.5–5)
ALBUMIN SERPL BCP-MCNC: 2.6 G/DL (ref 3.5–5)
ALBUMIN SERPL BCP-MCNC: 3.8 G/DL (ref 3.5–5)
ALBUMIN SERPL BCP-MCNC: 3.9 G/DL (ref 3.5–5)
ALP SERPL-CCNC: 101 U/L (ref 46–116)
ALP SERPL-CCNC: 103 U/L (ref 46–116)
ALP SERPL-CCNC: 377 U/L (ref 46–116)
ALP SERPL-CCNC: 389 U/L (ref 46–116)
ALT SERPL W P-5'-P-CCNC: 121 U/L (ref 12–78)
ALT SERPL W P-5'-P-CCNC: 163 U/L (ref 12–78)
ALT SERPL W P-5'-P-CCNC: 50 U/L (ref 12–78)
ALT SERPL W P-5'-P-CCNC: 56 U/L (ref 12–78)
ANION GAP SERPL CALCULATED.3IONS-SCNC: 10 MMOL/L (ref 4–13)
ANION GAP SERPL CALCULATED.3IONS-SCNC: 11 MMOL/L (ref 4–13)
ANION GAP SERPL CALCULATED.3IONS-SCNC: 8 MMOL/L (ref 4–13)
ANION GAP SERPL CALCULATED.3IONS-SCNC: 9 MMOL/L (ref 4–13)
AST SERPL W P-5'-P-CCNC: 292 U/L (ref 5–45)
AST SERPL W P-5'-P-CCNC: 30 U/L (ref 5–45)
AST SERPL W P-5'-P-CCNC: 339 U/L (ref 5–45)
AST SERPL W P-5'-P-CCNC: 37 U/L (ref 5–45)
BACTERIA UR QL AUTO: NORMAL /HPF
BASOPHILS # BLD AUTO: 0.03 THOUSANDS/ΜL (ref 0–0.1)
BASOPHILS # BLD AUTO: 0.04 THOUSANDS/ΜL (ref 0–0.1)
BASOPHILS NFR BLD AUTO: 0 % (ref 0–1)
BASOPHILS NFR BLD AUTO: 0 % (ref 0–1)
BASOPHILS NFR BLD AUTO: 1 % (ref 0–1)
BASOPHILS NFR BLD AUTO: 1 % (ref 0–1)
BILIRUB SERPL-MCNC: 0.6 MG/DL (ref 0.2–1)
BILIRUB SERPL-MCNC: 0.6 MG/DL (ref 0.2–1)
BILIRUB SERPL-MCNC: 1.77 MG/DL (ref 0.2–1)
BILIRUB SERPL-MCNC: 3.67 MG/DL (ref 0.2–1)
BILIRUB UR QL STRIP: NEGATIVE
BUN SERPL-MCNC: 11 MG/DL (ref 5–25)
BUN SERPL-MCNC: 13 MG/DL (ref 5–25)
BUN SERPL-MCNC: 16 MG/DL (ref 5–25)
BUN SERPL-MCNC: 19 MG/DL (ref 5–25)
CALCIUM ALBUM COR SERPL-MCNC: 11.8 MG/DL (ref 8.3–10.1)
CALCIUM ALBUM COR SERPL-MCNC: 12.8 MG/DL (ref 8.3–10.1)
CALCIUM SERPL-MCNC: 10.7 MG/DL (ref 8.3–10.1)
CALCIUM SERPL-MCNC: 11.5 MG/DL (ref 8.3–10.1)
CALCIUM SERPL-MCNC: 9.2 MG/DL (ref 8.3–10.1)
CALCIUM SERPL-MCNC: 9.4 MG/DL (ref 8.3–10.1)
CANCER AG15-3 SERPL-ACNC: 202.7 U/ML (ref 0–25)
CANCER AG15-3 SERPL-ACNC: ABNORMAL U/ML (ref 0–25)
CANCER AG27-29 SERPL-ACNC: 6025.2 U/ML (ref 0–42.3)
CHLORIDE SERPL-SCNC: 103 MMOL/L (ref 100–108)
CHLORIDE SERPL-SCNC: 105 MMOL/L (ref 100–108)
CHLORIDE SERPL-SCNC: 106 MMOL/L (ref 100–108)
CHLORIDE SERPL-SCNC: 97 MMOL/L (ref 100–108)
CHOLEST SERPL-MCNC: 144 MG/DL (ref 50–200)
CLARITY UR: CLEAR
CO2 SERPL-SCNC: 22 MMOL/L (ref 21–32)
CO2 SERPL-SCNC: 26 MMOL/L (ref 21–32)
CO2 SERPL-SCNC: 27 MMOL/L (ref 21–32)
CO2 SERPL-SCNC: 27 MMOL/L (ref 21–32)
COLOR UR: YELLOW
CREAT SERPL-MCNC: 0.84 MG/DL (ref 0.6–1.3)
CREAT SERPL-MCNC: 0.94 MG/DL (ref 0.6–1.3)
CREAT SERPL-MCNC: 1.42 MG/DL (ref 0.6–1.3)
CREAT SERPL-MCNC: 1.59 MG/DL (ref 0.6–1.3)
EOSINOPHIL # BLD AUTO: 0.01 THOUSAND/ΜL (ref 0–0.61)
EOSINOPHIL # BLD AUTO: 0.02 THOUSAND/ΜL (ref 0–0.61)
EOSINOPHIL # BLD AUTO: 0.09 THOUSAND/ΜL (ref 0–0.61)
EOSINOPHIL # BLD AUTO: 0.11 THOUSAND/ΜL (ref 0–0.61)
EOSINOPHIL NFR BLD AUTO: 0 % (ref 0–6)
EOSINOPHIL NFR BLD AUTO: 0 % (ref 0–6)
EOSINOPHIL NFR BLD AUTO: 1 % (ref 0–6)
EOSINOPHIL NFR BLD AUTO: 2 % (ref 0–6)
ERYTHROCYTE [DISTWIDTH] IN BLOOD BY AUTOMATED COUNT: 13.5 % (ref 11.6–15.1)
ERYTHROCYTE [DISTWIDTH] IN BLOOD BY AUTOMATED COUNT: 13.9 % (ref 11.6–15.1)
ERYTHROCYTE [DISTWIDTH] IN BLOOD BY AUTOMATED COUNT: 16.4 % (ref 11.6–15.1)
ERYTHROCYTE [DISTWIDTH] IN BLOOD BY AUTOMATED COUNT: 18.6 % (ref 11.6–15.1)
GFR SERPL CREATININE-BSD FRML MDRD: 34 ML/MIN/1.73SQ M
GFR SERPL CREATININE-BSD FRML MDRD: 39 ML/MIN/1.73SQ M
GFR SERPL CREATININE-BSD FRML MDRD: 65 ML/MIN/1.73SQ M
GFR SERPL CREATININE-BSD FRML MDRD: 75 ML/MIN/1.73SQ M
GLUCOSE P FAST SERPL-MCNC: 86 MG/DL (ref 65–99)
GLUCOSE SERPL-MCNC: 105 MG/DL (ref 65–140)
GLUCOSE SERPL-MCNC: 117 MG/DL (ref 65–140)
GLUCOSE SERPL-MCNC: 136 MG/DL (ref 65–140)
GLUCOSE UR STRIP-MCNC: NEGATIVE MG/DL
HCT VFR BLD AUTO: 39.4 % (ref 34.8–46.1)
HCT VFR BLD AUTO: 41 % (ref 34.8–46.1)
HCT VFR BLD AUTO: 43.1 % (ref 34.8–46.1)
HCT VFR BLD AUTO: 47.1 % (ref 34.8–46.1)
HDLC SERPL-MCNC: 47 MG/DL (ref 40–60)
HGB BLD-MCNC: 13.4 G/DL (ref 11.5–15.4)
HGB BLD-MCNC: 14 G/DL (ref 11.5–15.4)
HGB BLD-MCNC: 14.7 G/DL (ref 11.5–15.4)
HGB BLD-MCNC: 16.5 G/DL (ref 11.5–15.4)
HGB UR QL STRIP.AUTO: NEGATIVE
IMM GRANULOCYTES # BLD AUTO: 0.01 THOUSAND/UL (ref 0–0.2)
IMM GRANULOCYTES # BLD AUTO: 0.02 THOUSAND/UL (ref 0–0.2)
IMM GRANULOCYTES # BLD AUTO: 0.02 THOUSAND/UL (ref 0–0.2)
IMM GRANULOCYTES # BLD AUTO: 0.04 THOUSAND/UL (ref 0–0.2)
IMM GRANULOCYTES NFR BLD AUTO: 0 % (ref 0–2)
IMM GRANULOCYTES NFR BLD AUTO: 1 % (ref 0–2)
KETONES UR STRIP-MCNC: NEGATIVE MG/DL
LDLC SERPL CALC-MCNC: 71 MG/DL (ref 0–100)
LEUKOCYTE ESTERASE UR QL STRIP: NEGATIVE
LYMPHOCYTES # BLD AUTO: 1.28 THOUSANDS/ΜL (ref 0.6–4.47)
LYMPHOCYTES # BLD AUTO: 1.3 THOUSANDS/ΜL (ref 0.6–4.47)
LYMPHOCYTES # BLD AUTO: 2.34 THOUSANDS/ΜL (ref 0.6–4.47)
LYMPHOCYTES # BLD AUTO: 2.78 THOUSANDS/ΜL (ref 0.6–4.47)
LYMPHOCYTES NFR BLD AUTO: 15 % (ref 14–44)
LYMPHOCYTES NFR BLD AUTO: 16 % (ref 14–44)
LYMPHOCYTES NFR BLD AUTO: 35 % (ref 14–44)
LYMPHOCYTES NFR BLD AUTO: 41 % (ref 14–44)
MCH RBC QN AUTO: 29.8 PG (ref 26.8–34.3)
MCH RBC QN AUTO: 30.9 PG (ref 26.8–34.3)
MCHC RBC AUTO-ENTMCNC: 34 G/DL (ref 31.4–37.4)
MCHC RBC AUTO-ENTMCNC: 34.1 G/DL (ref 31.4–37.4)
MCHC RBC AUTO-ENTMCNC: 34.1 G/DL (ref 31.4–37.4)
MCHC RBC AUTO-ENTMCNC: 35 G/DL (ref 31.4–37.4)
MCV RBC AUTO: 87 FL (ref 82–98)
MCV RBC AUTO: 87 FL (ref 82–98)
MCV RBC AUTO: 88 FL (ref 82–98)
MCV RBC AUTO: 88 FL (ref 82–98)
MONOCYTES # BLD AUTO: 0.39 THOUSAND/ΜL (ref 0.17–1.22)
MONOCYTES # BLD AUTO: 0.53 THOUSAND/ΜL (ref 0.17–1.22)
MONOCYTES # BLD AUTO: 0.73 THOUSAND/ΜL (ref 0.17–1.22)
MONOCYTES # BLD AUTO: 0.95 THOUSAND/ΜL (ref 0.17–1.22)
MONOCYTES NFR BLD AUTO: 11 % (ref 4–12)
MONOCYTES NFR BLD AUTO: 7 % (ref 4–12)
MONOCYTES NFR BLD AUTO: 7 % (ref 4–12)
MONOCYTES NFR BLD AUTO: 9 % (ref 4–12)
NEUTROPHILS # BLD AUTO: 2.92 THOUSANDS/ΜL (ref 1.85–7.62)
NEUTROPHILS # BLD AUTO: 4.39 THOUSANDS/ΜL (ref 1.85–7.62)
NEUTROPHILS # BLD AUTO: 6.26 THOUSANDS/ΜL (ref 1.85–7.62)
NEUTROPHILS # BLD AUTO: 6.42 THOUSANDS/ΜL (ref 1.85–7.62)
NEUTS SEG NFR BLD AUTO: 49 % (ref 43–75)
NEUTS SEG NFR BLD AUTO: 57 % (ref 43–75)
NEUTS SEG NFR BLD AUTO: 72 % (ref 43–75)
NEUTS SEG NFR BLD AUTO: 75 % (ref 43–75)
NITRITE UR QL STRIP: NEGATIVE
NON-SQ EPI CELLS URNS QL MICRO: NORMAL /HPF
NONHDLC SERPL-MCNC: 97 MG/DL
NRBC BLD AUTO-RTO: 0 /100 WBCS
PH UR STRIP.AUTO: 5.5 [PH]
PLATELET # BLD AUTO: 221 THOUSANDS/UL (ref 149–390)
PLATELET # BLD AUTO: 227 THOUSANDS/UL (ref 149–390)
PLATELET # BLD AUTO: 292 THOUSANDS/UL (ref 149–390)
PLATELET # BLD AUTO: 301 THOUSANDS/UL (ref 149–390)
PMV BLD AUTO: 10.5 FL (ref 8.9–12.7)
PMV BLD AUTO: 10.5 FL (ref 8.9–12.7)
PMV BLD AUTO: 9.6 FL (ref 8.9–12.7)
PMV BLD AUTO: 9.9 FL (ref 8.9–12.7)
POTASSIUM SERPL-SCNC: 2.8 MMOL/L (ref 3.5–5.3)
POTASSIUM SERPL-SCNC: 3.3 MMOL/L (ref 3.5–5.3)
POTASSIUM SERPL-SCNC: 4.5 MMOL/L (ref 3.5–5.3)
POTASSIUM SERPL-SCNC: 5 MMOL/L (ref 3.5–5.3)
PROT SERPL-MCNC: 6.6 G/DL (ref 6.4–8.2)
PROT SERPL-MCNC: 6.6 G/DL (ref 6.4–8.2)
PROT SERPL-MCNC: 7.2 G/DL (ref 6.4–8.2)
PROT SERPL-MCNC: 7.4 G/DL (ref 6.4–8.2)
PROT UR STRIP-MCNC: NEGATIVE MG/DL
RBC # BLD AUTO: 4.5 MILLION/UL (ref 3.81–5.12)
RBC # BLD AUTO: 4.7 MILLION/UL (ref 3.81–5.12)
RBC # BLD AUTO: 4.94 MILLION/UL (ref 3.81–5.12)
RBC # BLD AUTO: 5.34 MILLION/UL (ref 3.81–5.12)
RBC #/AREA URNS AUTO: NORMAL /HPF
SODIUM SERPL-SCNC: 134 MMOL/L (ref 136–145)
SODIUM SERPL-SCNC: 135 MMOL/L (ref 136–145)
SODIUM SERPL-SCNC: 141 MMOL/L (ref 136–145)
SODIUM SERPL-SCNC: 141 MMOL/L (ref 136–145)
SP GR UR STRIP.AUTO: 1.01 (ref 1–1.03)
TRIGL SERPL-MCNC: 128 MG/DL
TSH SERPL DL<=0.05 MIU/L-ACNC: 1.88 UIU/ML (ref 0.36–3.74)
UROBILINOGEN UR QL STRIP.AUTO: 0.2 E.U./DL
WBC # BLD AUTO: 5.78 THOUSAND/UL (ref 4.31–10.16)
WBC # BLD AUTO: 7.86 THOUSAND/UL (ref 4.31–10.16)
WBC # BLD AUTO: 8.36 THOUSAND/UL (ref 4.31–10.16)
WBC # BLD AUTO: 8.74 THOUSAND/UL (ref 4.31–10.16)
WBC #/AREA URNS AUTO: NORMAL /HPF

## 2019-01-01 PROCEDURE — A9503 TC99M MEDRONATE: HCPCS

## 2019-01-01 PROCEDURE — 80053 COMPREHEN METABOLIC PANEL: CPT

## 2019-01-01 PROCEDURE — 96361 HYDRATE IV INFUSION ADD-ON: CPT

## 2019-01-01 PROCEDURE — 96360 HYDRATION IV INFUSION INIT: CPT

## 2019-01-01 PROCEDURE — 96402 CHEMO HORMON ANTINEOPL SQ/IM: CPT

## 2019-01-01 PROCEDURE — 86300 IMMUNOASSAY TUMOR CA 15-3: CPT

## 2019-01-01 PROCEDURE — 80053 COMPREHEN METABOLIC PANEL: CPT | Performed by: INTERNAL MEDICINE

## 2019-01-01 PROCEDURE — 84443 ASSAY THYROID STIM HORMONE: CPT

## 2019-01-01 PROCEDURE — 78306 BONE IMAGING WHOLE BODY: CPT

## 2019-01-01 PROCEDURE — 96401 CHEMO ANTI-NEOPL SQ/IM: CPT

## 2019-01-01 PROCEDURE — 99214 OFFICE O/P EST MOD 30 MIN: CPT | Performed by: INTERNAL MEDICINE

## 2019-01-01 PROCEDURE — 36415 COLL VENOUS BLD VENIPUNCTURE: CPT

## 2019-01-01 PROCEDURE — 85025 COMPLETE CBC W/AUTO DIFF WBC: CPT | Performed by: INTERNAL MEDICINE

## 2019-01-01 PROCEDURE — 96365 THER/PROPH/DIAG IV INF INIT: CPT

## 2019-01-01 PROCEDURE — 85025 COMPLETE CBC W/AUTO DIFF WBC: CPT

## 2019-01-01 PROCEDURE — 99215 OFFICE O/P EST HI 40 MIN: CPT | Performed by: INTERNAL MEDICINE

## 2019-01-01 PROCEDURE — 71046 X-RAY EXAM CHEST 2 VIEWS: CPT

## 2019-01-01 PROCEDURE — 82306 VITAMIN D 25 HYDROXY: CPT

## 2019-01-01 PROCEDURE — 36415 COLL VENOUS BLD VENIPUNCTURE: CPT | Performed by: INTERNAL MEDICINE

## 2019-01-01 PROCEDURE — 80061 LIPID PANEL: CPT

## 2019-01-01 PROCEDURE — 81001 URINALYSIS AUTO W/SCOPE: CPT

## 2019-01-01 RX ORDER — LAMOTRIGINE 25 MG/1
500 TABLET ORAL ONCE
Status: COMPLETED | OUTPATIENT
Start: 2019-01-01 | End: 2019-01-01

## 2019-01-01 RX ORDER — SODIUM CHLORIDE 9 MG/ML
20 INJECTION, SOLUTION INTRAVENOUS ONCE
Status: CANCELLED | OUTPATIENT
Start: 2019-01-01

## 2019-01-01 RX ORDER — LAMOTRIGINE 25 MG/1
500 TABLET ORAL ONCE
Status: CANCELLED | OUTPATIENT
Start: 2020-01-01

## 2019-01-01 RX ORDER — LAMOTRIGINE 25 MG/1
500 TABLET ORAL ONCE
Status: CANCELLED | OUTPATIENT
Start: 2019-01-01

## 2019-01-01 RX ORDER — ANASTROZOLE 1 MG/1
TABLET ORAL
Qty: 30 TABLET | Refills: 11 | Status: SHIPPED | OUTPATIENT
Start: 2019-01-01 | End: 2019-01-01 | Stop reason: ALTCHOICE

## 2019-01-01 RX ORDER — BUSPIRONE HYDROCHLORIDE 7.5 MG/1
15 TABLET ORAL DAILY
Refills: 0 | COMMUNITY
Start: 2019-01-01 | End: 2019-01-01 | Stop reason: ALTCHOICE

## 2019-01-01 RX ORDER — METOCLOPRAMIDE 10 MG/1
10 TABLET ORAL 4 TIMES DAILY PRN
Qty: 60 TABLET | Refills: 1 | Status: SHIPPED | OUTPATIENT
Start: 2019-01-01 | End: 2020-01-01 | Stop reason: HOSPADM

## 2019-01-01 RX ORDER — SODIUM CHLORIDE 9 MG/ML
20 INJECTION, SOLUTION INTRAVENOUS ONCE
Status: COMPLETED | OUTPATIENT
Start: 2019-01-01 | End: 2019-01-01

## 2019-01-01 RX ORDER — ONDANSETRON 4 MG/1
4 TABLET, FILM COATED ORAL EVERY 8 HOURS PRN
Qty: 30 TABLET | Refills: 1 | Status: SHIPPED | OUTPATIENT
Start: 2019-01-01 | End: 2020-01-01 | Stop reason: HOSPADM

## 2019-01-01 RX ADMIN — FULVESTRANT 500 MG: 50 INJECTION INTRAMUSCULAR at 10:04

## 2019-01-01 RX ADMIN — DENOSUMAB 120 MG: 120 INJECTION SUBCUTANEOUS at 10:27

## 2019-01-01 RX ADMIN — SODIUM CHLORIDE 1000 ML: 0.9 INJECTION, SOLUTION INTRAVENOUS at 12:45

## 2019-01-01 RX ADMIN — ZOLEDRONIC ACID 3 MG: 4 INJECTION, SOLUTION, CONCENTRATE INTRAVENOUS at 13:31

## 2019-01-01 RX ADMIN — SODIUM CHLORIDE 1000 ML: 0.9 INJECTION, SOLUTION INTRAVENOUS at 08:18

## 2019-01-01 RX ADMIN — SODIUM CHLORIDE 20 ML/HR: 0.9 INJECTION, SOLUTION INTRAVENOUS at 13:31

## 2019-12-09 NOTE — TELEPHONE ENCOUNTER
Patient called to scheduled appt for hospital follow up  Patient states there were spots on her liver that were found  Also states some of her labs were off  Patient can be reached at [phone]

## 2019-12-13 NOTE — TELEPHONE ENCOUNTER
RN-Mimi Zheng put in a script and sent it to dr roberts- once he signs it we will call patient to inform her

## 2019-12-13 NOTE — TELEPHONE ENCOUNTER
Ezra Mulligan called asking to speak to [facility]  Ezra Mulligan stated that she was admitted to [facility] 12/2/19 - 12/8/19 and she felt great upon d/c from hospital but as of yesterday, she began to feel nauseous  She would like a call with how to proceed  Please review and give the pt a call

## 2019-12-16 PROBLEM — E83.52 CALCIUM BLOOD INCREASED: Status: ACTIVE | Noted: 2019-01-01

## 2019-12-16 NOTE — TELEPHONE ENCOUNTER
----- Message from Sandra Morton MD sent at 12/16/2019  1:42 PM EST -----  Her Ca is high  Can you set up NS 1L over 2 hours followed by zometa 3mg IV (Not 4mg) Possibly tomorrow  Encourage good oral hydration

## 2019-12-16 NOTE — TELEPHONE ENCOUNTER
Spoke with patient to make her aware of the following  She has infusion set up for 12/18, after he appointment with Dr Latasha Frias  She will drink more fluids in the meantime  She verbalized understanding and agreed to plan

## 2019-12-18 PROBLEM — C78.7 LIVER METASTASIS (HCC): Status: ACTIVE | Noted: 2019-01-01

## 2019-12-18 NOTE — LETTER
December 18, 2019     CAROLYN Jeffery DO  [address]  [address] [facility]  [address]    Patient: Royer Nieves   YOB: 1955   Date of Visit: 12/18/2019       Dear Dr Elmore Search: Thank you for referring Zaria Nur to me for evaluation  Below are my notes for this consultation  If you have questions, please do not hesitate to call me  I look forward to following your patient along with you  Sincerely,        Svitlana Win MD        CC: No Recipients  Svitlana Win MD  12/18/2019 11:11 AM  Sign at close encounter  Hematology / Oncology Outpatient Follow Up Note    Royer Nieves 62 y o  female TCY:[id number]         Date:  12/18/2019    Assessment / Plan:  A 62 year old postmenopausal woman with stage IIIA right breast cancer with invasive lobular histology, grade 2, ER/NV positive HER-2 negative disease, diagnosed in 2010  She underwent mastectomy resulting in CHAPARRO  She had 5 positive lymph nodes  She was treated with adjuvant chemotherapy with [facility] followed by paclitaxel  She was subsequently treated with 5 years of adjuvant hormonal therapy with tamoxifen, since she was premenopausal  In August 2016, her postmenopausal condition was confirmed  Since then, she has been on anastrozole with no significant side effects  In December 2019, she was hospitalized with hypercalcemia which was treated with bisphosphonate as well as hydration  She was found to have metastatic disease in the liver which was confirmed by biopsy to be ER 95% positive, NV negative , HER2 negative disease  Her liver function test is elevated  She is still mildly hypercalcemic and has some symptom due to the hypercalcemia  She is going to have normal saline hydration later today followed by the med infusion 3 mg, based on renal function  She presents today with her  to discuss further treatment options  We had extensive discussion regarding metastatic breast cancer    I told her that this is treatable but not curable disease  At this time, she does not wish to know her prognostic information  I recommended her to discontinue anastrozole  As a next line of treatment, I recommended her to have fluvestrant with palbociclib  Side effects of this regimen was thoroughly discussed, including but not limited to rare LFT abnormality, neutropenia, very small risk of neutropenic fever  She is in agreement with my recommendation  Based on the pre-existing LFT abnormality as well as hypercalcemia, we will monitor not only CBC but also CMP every 2 weeks of which prescription was given to her  I will see her again in 6 weeks for routine follow-up  She and her  are in agreement with my recommendations                 Subjective:      HPI:             Interval History:  A 79-year-old postmenopausal female with stage IIIA right breast cancer, grade 2, invasive lobular histology, ER/NJ positive HER-2 negative disease  She underwent mastectomy with lymph node dissection followed by adjuvant chemotherapy with a c  followed by paclitaxel  She had 5 positive axillary lymph nodes at the time of surgery  Prior to the chemotherapy, she had regular menstrual cycle  She developed chemotherapy-induced menopause  She underwent adjuvant chemotherapy with AC , followed by paclitaxel  She was treated with 5 years of adjuvant hormonal therapy with tamoxifen  Her postmenopausal condition was confirmed in August 2016  Therefore, she is currently on anastrozole  In the past, her surgeon ordered CA 27, 29 which was mildly elevated  However, CT scan was negative for metastatic disease  Bone scan in November 2019 was negative for metastatic disease  In early December 2019, she was hospitalized to the [facility] with 2 weeks of nausea and vomiting  She was found to have hypercalcemia  CT scan shows liver mass which was biopsied    Biopsy showed metastatic adenocarcinoma, consistent with breast primary, ER 95% positive, FL negative, HER2 negative disease  She presents today with her  to discuss further treatment options  She has no complaint of pain  However, she has fatigue  She is still nauseous  She has 6 lb weight loss in the last 3 months  Her performance status is 1/4 on the ECOG scale                    Objective:      Primary Diagnosis:     1  Right breast cancer stage IIIA (pT2, pN2, M0) grade 2, ER/FL positive, HER-2 negative disease with invasive lobular carcinoma  5 positive axillary lymph nodes  Diagnosed in December 2010      2  Liver metastasis, ER 95% positive, FL negative, HER2 negative disease  Diagnosed in December 2019       Cancer Staging:  Cancer Staging  No matching staging information was found for the patient         Previous Hematologic/ Oncologic Treatment:      1  Adjuvant chemotherapy with dose-dense AC x4, followed by weekly paclitaxel x12   2  Adjuvant hormonal therapy with tamoxifen from July 2011 through August 2016      Current Hematologic/ Oncologic Treatment:       Adjuvant hormonal therapy with anastrozole since August 2016      Disease Status:      CHAPARRO status post mastectomy with axillary lymph node dissection      Test Results:     Pathology:           Radiology:     CT scan of chest abdomen pelvis in December 2019 at [facility] showed liver mass, suspicious for metastatic disease      Laboratory:     CA 27, 29 was 6000  CA 15-3 was 19,000, in December 2019  See below for CBC and CMP      Physical Exam:        General Appearance:    Alert, oriented          Eyes:    PERRL   Ears:    Normal external ear canals, both ears   Nose:   Nares normal, septum midline   Throat:   Mucosa moist  Pharynx without injection  Neck:   Supple         Lungs:     Clear to auscultation bilaterally   Chest Wall:    No tenderness or deformity    Heart:    Regular rate and rhythm         Abdomen:     Soft, non-tender, bowel sounds +, liver is palpable 3 cm below right costal margin  Spleen is not palpable                Extremities:   Extremities no cyanosis or edema         Skin:    very faint papular rash on the medial aspect of her right reconstructed breast   No warm feeling on the touch   No tenderness  Lymph nodes:   Cervical, supraclavicular, and axillary nodes normal   Neurologic:   CNII-XII intact, normal strength, sensation and reflexes     Throughout             Breast exam:   status post right mastectomy with TRAM flap reconstruction on the right  Status post left mastectomy without reconstruction  No palpable abnormality in her right reconstructed breast or left chest wall             ROS: Review of Systems   Constitutional:        Fatigue   Gastrointestinal:        Nausea   All other systems reviewed and are negative  Imaging: No results found  Labs:   Lab Results   Component Value Date    WBC 8 36 12/16/2019    HGB 14 7 12/16/2019    HCT 43 1 12/16/2019    MCV 87 12/16/2019     12/16/2019     Lab Results   Component Value Date     01/31/2015    K 3 3 (L) 12/16/2019     12/16/2019    CO2 22 12/16/2019    ANIONGAP 6 01/31/2015    BUN 11 12/16/2019    CREATININE 1 42 (H) 12/16/2019    GLUCOSE 90 01/31/2015    GLUF 86 04/20/2019    CALCIUM 10 7 (H) 12/16/2019    CORRECTEDCA 11 8 (H) 12/16/2019     (H) 12/16/2019     (H) 12/16/2019    ALKPHOS 389 (H) 12/16/2019    PROT 7 4 01/31/2015    BILITOT 0 4 01/31/2015    EGFR 39 12/16/2019         Current Medications: Reviewed  Allergies: Reviewed  PMH/FH/SH:  Reviewed      Vital Sign:    Body surface area is 1 65 meters squared      Wt Readings from Last 3 Encounters:   12/18/19 62 1 kg (137 lb)   05/24/19 62 6 kg (138 lb)   11/17/18 62 6 kg (138 lb)        Temp Readings from Last 3 Encounters:   12/18/19 97 7 °F (36 5 °C) (Tympanic Core)   05/24/19 97 5 °F (36 4 °C) (Tympanic Core)   07/27/18 98 1 °F (36 7 °C) (Tympanic)        BP Readings from Last 3 Encounters:   12/18/19 126/68   05/24/19 128/90   11/17/18 122/72         Pulse Readings from Last 3 Encounters:   12/18/19 (!) 132   05/24/19 102   07/27/18 94     @LASTSAO2(3)@

## 2019-12-18 NOTE — PROGRESS NOTES
Hematology / Oncology Outpatient Follow Up Note    Jorge Auguste 62 y o  female LHV:7/99/[id number]         Date:  12/18/2019    Assessment / Plan:  A 62 year old postmenopausal woman with stage IIIA right breast cancer with invasive lobular histology, grade 2, ER/OK positive HER-2 negative disease, diagnosed in 2010  She underwent mastectomy resulting in CHAPARRO  She had 5 positive lymph nodes  She was treated with adjuvant chemotherapy with [facility] [place], operated by [facility] followed by paclitaxel  She was subsequently treated with 5 years of adjuvant hormonal therapy with tamoxifen, since she was premenopausal  In August 2016, her postmenopausal condition was confirmed  Since then, she has been on anastrozole with no significant side effects  In December 2019, she was hospitalized with hypercalcemia which was treated with bisphosphonate as well as hydration  She was found to have metastatic disease in the liver which was confirmed by biopsy to be ER 95% positive, OK negative , HER2 negative disease  Her liver function test is elevated  She is still mildly hypercalcemic and has some symptom due to the hypercalcemia  She is going to have normal saline hydration later today followed by the med infusion 3 mg, based on renal function  She presents today with her  to discuss further treatment options  We had extensive discussion regarding metastatic breast cancer  I told her that this is treatable but not curable disease  At this time, she does not wish to know her prognostic information  I recommended her to discontinue anastrozole  As a next line of treatment, I recommended her to have fluvestrant with palbociclib  Side effects of this regimen was thoroughly discussed, including but not limited to rare LFT abnormality, neutropenia, very small risk of neutropenic fever  She is in agreement with my recommendation    Based on the pre-existing LFT abnormality as well as hypercalcemia, we will monitor not only CBC but also CMP every 2 weeks of which prescription was given to her  I will see her again in 6 weeks for routine follow-up  She and her  are in agreement with my recommendations                 Subjective:      HPI:             Interval History:  A 81-year-old postmenopausal female with stage IIIA right breast cancer, grade 2, invasive lobular histology, ER/AZ positive HER-2 negative disease  She underwent mastectomy with lymph node dissection followed by adjuvant chemotherapy with a c  followed by paclitaxel  She had 5 positive axillary lymph nodes at the time of surgery  Prior to the chemotherapy, she had regular menstrual cycle  She developed chemotherapy-induced menopause  She underwent adjuvant chemotherapy with AC , followed by paclitaxel  She was treated with 5 years of adjuvant hormonal therapy with tamoxifen  Her postmenopausal condition was confirmed in August 2016  Therefore, she is currently on anastrozole  In the past, her surgeon ordered CA 27, 29 which was mildly elevated  However, CT scan was negative for metastatic disease  Bone scan in November 2019 was negative for metastatic disease  In early December 2019, she was hospitalized to the [facility] with 2 weeks of nausea and vomiting  She was found to have hypercalcemia  CT scan shows liver mass which was biopsied  Biopsy showed metastatic adenocarcinoma, consistent with breast primary, ER 95% positive, AZ negative, HER2 negative disease  She presents today with her  to discuss further treatment options  She has no complaint of pain  However, she has fatigue  She is still nauseous  She has 6 lb weight loss in the last 3 months  Her performance status is 1/4 on the ECOG scale                    Objective:      Primary Diagnosis:     1  Right breast cancer stage IIIA (pT2, pN2, M0) grade 2, ER/AZ positive, HER-2 negative disease with invasive lobular carcinoma  5 positive axillary lymph nodes  Diagnosed in December 2010       2  Liver metastasis, ER 95% positive, [place] negative, HER2 negative disease  Diagnosed in December 2019       Cancer Staging:  Cancer Staging  No matching staging information was found for the patient         Previous Hematologic/ Oncologic Treatment:      1  Adjuvant chemotherapy with dose-dense AC x4, followed by weekly paclitaxel x12   2  Adjuvant hormonal therapy with tamoxifen from July 2011 through August 2016      Current Hematologic/ Oncologic Treatment:       Adjuvant hormonal therapy with anastrozole since August 2016      Disease Status:      CHAPARRO status post mastectomy with axillary lymph node dissection      Test Results:     Pathology:           Radiology:     CT scan of chest abdomen pelvis in December 2019 at [facility] showed liver mass, suspicious for metastatic disease      Laboratory:     CA 27, 29 was 6000  CA 15-3 was 19,000, in December 2019  See below for CBC and CMP      Physical Exam:        General Appearance:    Alert, oriented          Eyes:    PERRL   Ears:    Normal external ear canals, both ears   Nose:   Nares normal, septum midline   Throat:   Mucosa moist  Pharynx without injection  Neck:   Supple         Lungs:     Clear to auscultation bilaterally   Chest Wall:    No tenderness or deformity    Heart:    Regular rate and rhythm         Abdomen:     Soft, non-tender, bowel sounds +, liver is palpable 3 cm below right costal margin  Spleen is not palpable                Extremities:   Extremities no cyanosis or edema         Skin:    very faint papular rash on the medial aspect of her right reconstructed breast   No warm feeling on the touch   No tenderness  Lymph nodes:   Cervical, supraclavicular, and axillary nodes normal   Neurologic:   CNII-XII intact, normal strength, sensation and reflexes     Throughout             Breast exam:   status post right mastectomy with TRAM flap reconstruction on the right  Status post left mastectomy without reconstruction   No palpable abnormality in her right reconstructed breast or left chest wall             ROS: Review of Systems   Constitutional:        Fatigue   Gastrointestinal:        Nausea   All other systems reviewed and are negative  Imaging: No results found  Labs:   Lab Results   Component Value Date    WBC 8 36 12/16/2019    HGB 14 7 12/16/2019    HCT 43 1 12/16/2019    MCV 87 12/16/2019     12/16/2019     Lab Results   Component Value Date     01/31/2015    K 3 3 (L) 12/16/2019     12/16/2019    CO2 22 12/16/2019    ANIONGAP 6 01/31/2015    BUN 11 12/16/2019    CREATININE 1 42 (H) 12/16/2019    GLUCOSE 90 01/31/2015    GLUF 86 04/20/2019    CALCIUM 10 7 (H) 12/16/2019    CORRECTEDCA 11 8 (H) 12/16/2019     (H) 12/16/2019     (H) 12/16/2019    ALKPHOS 389 (H) 12/16/2019    PROT 7 4 01/31/2015    BILITOT 0 4 01/31/2015    EGFR 39 12/16/2019         Current Medications: Reviewed  Allergies: Reviewed  PMH/FH/SH:  Reviewed      Vital Sign:    Body surface area is 1 65 meters squared      Wt Readings from Last 3 Encounters:   12/18/19 62 1 kg (137 lb)   05/24/19 62 6 kg (138 lb)   11/17/18 62 6 kg (138 lb)        Temp Readings from Last 3 Encounters:   12/18/19 97 7 °F (36 5 °C) (Tympanic Core)   05/24/19 97 5 °F (36 4 °C) (Tympanic Core)   07/27/18 98 1 °F (36 7 °C) (Tympanic)        BP Readings from Last 3 Encounters:   12/18/19 126/68   05/24/19 128/90   11/17/18 122/72         Pulse Readings from Last 3 Encounters:   12/18/19 (!) 132   05/24/19 102   07/27/18 94     @LASTSAO2(3)@

## 2019-12-19 NOTE — PROGRESS NOTES
12-19-19  Received new oral chemo start    Lurena Back is pending     Enrolled patient in the [address] [place] [address]  ID# [id number]  [facility]#  [id number]  BIN#  [id number]    EFF 12-19-19  THRU 12-31-20    $0 Copay with $25,000 LIMIT PER YEAR    Email to team, Epic noted

## 2019-12-23 NOTE — PROGRESS NOTES
12/19/2019 received notification from clinical pt will be starting Ibrance 100 mg q d for 21 days followed by 7 days off    Pt has [address] 72   ID # [id number]  Karlstad Creed #[id number]  PCN # [id number]    Submitted for auth through cover my meds  12/23/2019 received approval from Ashok Friedman  Per the approval letter Delorismely [place] is valid from 10/23/2019 through 12/22/2020  Called [employer] @ 12:11 ([phone]) because per [facility], they received a rejection stating "not in network"   Per Emre Kruse, the pt is capitated to [employer]    Notified clinical, [facility], and finance  Also notified [place]  Forwarded the pt information , auth and finance assistance information over to [employer]  Requested the rx be sent there as well

## 2019-12-26 NOTE — PROGRESS NOTES
Patient tolerated Faslodex injections into right and left gluteal without issues  Patient verified upcoming appointment and AVS provided

## 2019-12-26 NOTE — TELEPHONE ENCOUNTER
Patient's calcium level is still elevated  She will have NSS over 2 hours tomorrow at the [facility] infusion center [facility] [address], 12/27  She will also received Xgeva  She verbalized understanding and agreed to plan

## 2019-12-27 NOTE — PROGRESS NOTES
Pt here for hydration and xgeva for elevated calcium levels  Pt states that she has some nausea at home, the reglan seems to help  Vitals stable  Labs reviewed  Spoke with Marylin Kirkland RN-pt is to get weekly labs to monitor calcium levels  Pt aware, states understanding  Call bell in reach, will continue to monitor

## 2019-12-27 NOTE — PROGRESS NOTES
Pt tolerated hydration well without any adverse reactions  Xgeva given in L arm without complication, Aware of next appointments   Declines AVS

## 2020-01-01 ENCOUNTER — APPOINTMENT (OUTPATIENT)
Dept: LAB | Facility: CLINIC | Age: 65
End: 2020-01-01
Payer: COMMERCIAL

## 2020-01-01 ENCOUNTER — TELEPHONE (OUTPATIENT)
Dept: HEMATOLOGY ONCOLOGY | Facility: CLINIC | Age: 65
End: 2020-01-01

## 2020-01-01 ENCOUNTER — TRANSCRIBE ORDERS (OUTPATIENT)
Dept: LAB | Facility: CLINIC | Age: 65
End: 2020-01-01

## 2020-01-01 ENCOUNTER — APPOINTMENT (INPATIENT)
Dept: MRI IMAGING | Facility: HOSPITAL | Age: 65
DRG: 436 | End: 2020-01-01
Payer: COMMERCIAL

## 2020-01-01 ENCOUNTER — TELEPHONE (OUTPATIENT)
Dept: INFUSION CENTER | Facility: HOSPITAL | Age: 65
End: 2020-01-01

## 2020-01-01 ENCOUNTER — HOSPITAL ENCOUNTER (OUTPATIENT)
Dept: INFUSION CENTER | Facility: CLINIC | Age: 65
Discharge: HOME | End: 2020-01-10
Payer: COMMERCIAL

## 2020-01-01 ENCOUNTER — OFFICE VISIT (OUTPATIENT)
Dept: HEMATOLOGY ONCOLOGY | Facility: CLINIC | Age: 65
End: 2020-01-01
Payer: COMMERCIAL

## 2020-01-01 ENCOUNTER — HOSPITAL ENCOUNTER (OUTPATIENT)
Dept: INFUSION CENTER | Facility: CLINIC | Age: 65
Discharge: HOME | End: 2020-01-14
Payer: COMMERCIAL

## 2020-01-01 ENCOUNTER — HOSPITAL ENCOUNTER (OUTPATIENT)
Dept: INFUSION CENTER | Facility: CLINIC | Age: 65
End: 2020-01-01

## 2020-01-01 ENCOUNTER — APPOINTMENT (OUTPATIENT)
Dept: LAB | Facility: CLINIC | Age: 65
DRG: 436 | End: 2020-01-01
Payer: COMMERCIAL

## 2020-01-01 ENCOUNTER — TELEPHONE (OUTPATIENT)
Dept: NEPHROLOGY | Facility: CLINIC | Age: 65
End: 2020-01-01

## 2020-01-01 ENCOUNTER — TELEPHONE (OUTPATIENT)
Dept: INFUSION CENTER | Facility: CLINIC | Age: 65
End: 2020-01-01

## 2020-01-01 ENCOUNTER — TELEPHONE (OUTPATIENT)
Dept: HEMATOLOGY ONCOLOGY | Facility: MEDICAL CENTER | Age: 65
End: 2020-01-01

## 2020-01-01 ENCOUNTER — HOSPITAL ENCOUNTER (INPATIENT)
Facility: HOSPITAL | Age: 65
LOS: 6 days | Discharge: HOME HEALTH | DRG: 436 | End: 2020-01-09
Attending: EMERGENCY MEDICINE | Admitting: INTERNAL MEDICINE
Payer: COMMERCIAL

## 2020-01-01 ENCOUNTER — HOSPITAL ENCOUNTER (OUTPATIENT)
Facility: HOSPITAL | Age: 65
Setting detail: OBSERVATION
End: 2020-01-22
Attending: EMERGENCY MEDICINE | Admitting: INTERNAL MEDICINE
Payer: COMMERCIAL

## 2020-01-01 ENCOUNTER — TELEPHONE (OUTPATIENT)
Dept: GYNECOLOGIC ONCOLOGY | Facility: CLINIC | Age: 65
End: 2020-01-01

## 2020-01-01 ENCOUNTER — APPOINTMENT (EMERGENCY)
Dept: CT IMAGING | Facility: HOSPITAL | Age: 65
DRG: 436 | End: 2020-01-01
Payer: COMMERCIAL

## 2020-01-01 ENCOUNTER — APPOINTMENT (INPATIENT)
Dept: CT IMAGING | Facility: HOSPITAL | Age: 65
DRG: 436 | End: 2020-01-01
Payer: COMMERCIAL

## 2020-01-01 VITALS
DIASTOLIC BLOOD PRESSURE: 75 MMHG | TEMPERATURE: 98.1 F | OXYGEN SATURATION: 99 % | HEIGHT: 63 IN | HEART RATE: 94 BPM | WEIGHT: 131.1 LBS | BODY MASS INDEX: 23.23 KG/M2 | RESPIRATION RATE: 18 BRPM | SYSTOLIC BLOOD PRESSURE: 121 MMHG

## 2020-01-01 VITALS
BODY MASS INDEX: 27.46 KG/M2 | RESPIRATION RATE: 18 BRPM | SYSTOLIC BLOOD PRESSURE: 122 MMHG | OXYGEN SATURATION: 95 % | DIASTOLIC BLOOD PRESSURE: 78 MMHG | TEMPERATURE: 97.8 F | WEIGHT: 155 LBS | HEART RATE: 124 BPM | HEIGHT: 63 IN

## 2020-01-01 VITALS
HEART RATE: 118 BPM | OXYGEN SATURATION: 93 % | RESPIRATION RATE: 18 BRPM | SYSTOLIC BLOOD PRESSURE: 140 MMHG | DIASTOLIC BLOOD PRESSURE: 80 MMHG | TEMPERATURE: 97.7 F

## 2020-01-01 VITALS
BODY MASS INDEX: 28.24 KG/M2 | HEART RATE: 108 BPM | OXYGEN SATURATION: 97 % | SYSTOLIC BLOOD PRESSURE: 127 MMHG | DIASTOLIC BLOOD PRESSURE: 60 MMHG | TEMPERATURE: 97.6 F | RESPIRATION RATE: 28 BRPM | WEIGHT: 159.39 LBS

## 2020-01-01 DIAGNOSIS — C78.7 LIVER METASTASIS (HCC): Primary | ICD-10-CM

## 2020-01-01 DIAGNOSIS — C50.911 MALIGNANT NEOPLASM OF RIGHT BREAST IN FEMALE, ESTROGEN RECEPTOR POSITIVE, UNSPECIFIED SITE OF BREAST (HCC): Primary | ICD-10-CM

## 2020-01-01 DIAGNOSIS — C50.919 MALIGNANT NEOPLASM OF BREAST IN FEMALE, ESTROGEN RECEPTOR POSITIVE, UNSPECIFIED LATERALITY, UNSPECIFIED SITE OF BREAST (HCC): ICD-10-CM

## 2020-01-01 DIAGNOSIS — R17 JAUNDICE: ICD-10-CM

## 2020-01-01 DIAGNOSIS — R74.8 ELEVATED LIVER ENZYMES: ICD-10-CM

## 2020-01-01 DIAGNOSIS — R52 PAIN: ICD-10-CM

## 2020-01-01 DIAGNOSIS — Z17.0 MALIGNANT NEOPLASM OF RIGHT BREAST IN FEMALE, ESTROGEN RECEPTOR POSITIVE, UNSPECIFIED SITE OF BREAST (HCC): Primary | ICD-10-CM

## 2020-01-01 DIAGNOSIS — Z17.0 MALIGNANT NEOPLASM OF RIGHT BREAST IN FEMALE, ESTROGEN RECEPTOR POSITIVE, UNSPECIFIED SITE OF BREAST (HCC): ICD-10-CM

## 2020-01-01 DIAGNOSIS — E87.6 HYPOKALEMIA: ICD-10-CM

## 2020-01-01 DIAGNOSIS — E87.2 METABOLIC ACIDOSIS: ICD-10-CM

## 2020-01-01 DIAGNOSIS — C78.7 LIVER METASTASIS (HCC): ICD-10-CM

## 2020-01-01 DIAGNOSIS — C79.9 METASTATIC DISEASE (HCC): Primary | ICD-10-CM

## 2020-01-01 DIAGNOSIS — C50.911 MALIGNANT NEOPLASM OF RIGHT BREAST IN FEMALE, ESTROGEN RECEPTOR POSITIVE, UNSPECIFIED SITE OF BREAST (HCC): ICD-10-CM

## 2020-01-01 DIAGNOSIS — D69.6 THROMBOCYTOPENIA (HCC): Primary | ICD-10-CM

## 2020-01-01 DIAGNOSIS — R93.89 ABNORMAL COMPUTED TOMOGRAPHY ANGIOGRAPHY (CTA): ICD-10-CM

## 2020-01-01 DIAGNOSIS — R00.0 TACHYCARDIA: ICD-10-CM

## 2020-01-01 DIAGNOSIS — R11.2 CHEMOTHERAPY INDUCED NAUSEA AND VOMITING: ICD-10-CM

## 2020-01-01 DIAGNOSIS — I26.99 ACUTE PULMONARY EMBOLISM WITHOUT ACUTE COR PULMONALE, UNSPECIFIED PULMONARY EMBOLISM TYPE (HCC): ICD-10-CM

## 2020-01-01 DIAGNOSIS — E87.1 HYPONATREMIA: ICD-10-CM

## 2020-01-01 DIAGNOSIS — D69.6 THROMBOCYTOPENIA (HCC): ICD-10-CM

## 2020-01-01 DIAGNOSIS — R52 PAIN: Primary | ICD-10-CM

## 2020-01-01 DIAGNOSIS — T45.1X5A CHEMOTHERAPY INDUCED NAUSEA AND VOMITING: ICD-10-CM

## 2020-01-01 DIAGNOSIS — I26.99 PULMONARY EMBOLISM (HCC): ICD-10-CM

## 2020-01-01 DIAGNOSIS — E87.1 HYPONATREMIA: Primary | ICD-10-CM

## 2020-01-01 DIAGNOSIS — Z17.0 MALIGNANT NEOPLASM OF BREAST IN FEMALE, ESTROGEN RECEPTOR POSITIVE, UNSPECIFIED LATERALITY, UNSPECIFIED SITE OF BREAST (HCC): ICD-10-CM

## 2020-01-01 DIAGNOSIS — R79.89 ELEVATED SERUM CREATININE: ICD-10-CM

## 2020-01-01 DIAGNOSIS — R41.82 ALTERED MENTAL STATUS: Primary | ICD-10-CM

## 2020-01-01 LAB
ABO GROUP BLD BPU: NORMAL
ABO GROUP BLD BPU: NORMAL
ABO GROUP BLD: NORMAL
ACANTHOCYTES BLD QL SMEAR: PRESENT
ALBUMIN SERPL BCP-MCNC: 1.4 G/DL (ref 3.5–5)
ALBUMIN SERPL BCP-MCNC: 1.5 G/DL (ref 3.5–5)
ALBUMIN SERPL BCP-MCNC: 1.8 G/DL (ref 3.5–5)
ALBUMIN SERPL BCP-MCNC: 2.2 G/DL (ref 3.5–5)
ALP SERPL-CCNC: 222 U/L (ref 46–116)
ALP SERPL-CCNC: 242 U/L (ref 46–116)
ALP SERPL-CCNC: 251 U/L (ref 46–116)
ALP SERPL-CCNC: 323 U/L (ref 46–116)
ALT SERPL W P-5'-P-CCNC: 100 U/L (ref 12–78)
ALT SERPL W P-5'-P-CCNC: 48 U/L (ref 12–78)
ALT SERPL W P-5'-P-CCNC: 68 U/L (ref 12–78)
ALT SERPL W P-5'-P-CCNC: 77 U/L (ref 12–78)
ANION GAP SERPL CALCULATED.3IONS-SCNC: 10 MMOL/L (ref 4–13)
ANION GAP SERPL CALCULATED.3IONS-SCNC: 10 MMOL/L (ref 4–13)
ANION GAP SERPL CALCULATED.3IONS-SCNC: 11 MMOL/L (ref 4–13)
ANION GAP SERPL CALCULATED.3IONS-SCNC: 12 MMOL/L (ref 4–13)
ANION GAP SERPL CALCULATED.3IONS-SCNC: 7 MMOL/L (ref 4–13)
ANION GAP SERPL CALCULATED.3IONS-SCNC: 7 MMOL/L (ref 4–13)
ANION GAP SERPL CALCULATED.3IONS-SCNC: 9 MMOL/L (ref 4–13)
APTT PPP: 36 SECONDS (ref 23–37)
AST SERPL W P-5'-P-CCNC: 145 U/L (ref 5–45)
AST SERPL W P-5'-P-CCNC: 175 U/L (ref 5–45)
AST SERPL W P-5'-P-CCNC: 192 U/L (ref 5–45)
AST SERPL W P-5'-P-CCNC: 273 U/L (ref 5–45)
BASOPHILS # BLD AUTO: 0.01 THOUSANDS/ΜL (ref 0–0.1)
BASOPHILS # BLD AUTO: 0.03 THOUSANDS/ΜL (ref 0–0.1)
BASOPHILS # BLD MANUAL: 0 THOUSAND/UL (ref 0–0.1)
BASOPHILS # BLD MANUAL: 0 THOUSAND/UL (ref 0–0.1)
BASOPHILS NFR BLD AUTO: 0 % (ref 0–1)
BASOPHILS NFR BLD AUTO: 0 % (ref 0–1)
BASOPHILS NFR MAR MANUAL: 0 % (ref 0–1)
BASOPHILS NFR MAR MANUAL: 0 % (ref 0–1)
BILIRUB SERPL-MCNC: 17.24 MG/DL (ref 0.2–1)
BILIRUB SERPL-MCNC: 6.17 MG/DL (ref 0.2–1)
BILIRUB SERPL-MCNC: 6.22 MG/DL (ref 0.2–1)
BILIRUB SERPL-MCNC: 6.54 MG/DL (ref 0.2–1)
BLD GP AB SCN SERPL QL: NEGATIVE
BPU ID: NORMAL
BPU ID: NORMAL
BUN SERPL-MCNC: 13 MG/DL (ref 5–25)
BUN SERPL-MCNC: 14 MG/DL (ref 5–25)
BUN SERPL-MCNC: 15 MG/DL (ref 5–25)
BUN SERPL-MCNC: 16 MG/DL (ref 5–25)
BUN SERPL-MCNC: 17 MG/DL (ref 5–25)
BUN SERPL-MCNC: 19 MG/DL (ref 5–25)
BUN SERPL-MCNC: 20 MG/DL (ref 5–25)
BUN SERPL-MCNC: 37 MG/DL (ref 5–25)
CALCIUM SERPL-MCNC: 10.3 MG/DL (ref 8.3–10.1)
CALCIUM SERPL-MCNC: 7.1 MG/DL (ref 8.3–10.1)
CALCIUM SERPL-MCNC: 7.8 MG/DL (ref 8.3–10.1)
CALCIUM SERPL-MCNC: 8 MG/DL (ref 8.3–10.1)
CALCIUM SERPL-MCNC: 8.3 MG/DL (ref 8.3–10.1)
CALCIUM SERPL-MCNC: 8.4 MG/DL (ref 8.3–10.1)
CALCIUM SERPL-MCNC: 8.5 MG/DL (ref 8.3–10.1)
CALCIUM SERPL-MCNC: 8.7 MG/DL (ref 8.3–10.1)
CHLORIDE SERPL-SCNC: 100 MMOL/L (ref 100–108)
CHLORIDE SERPL-SCNC: 102 MMOL/L (ref 100–108)
CHLORIDE SERPL-SCNC: 92 MMOL/L (ref 100–108)
CHLORIDE SERPL-SCNC: 94 MMOL/L (ref 100–108)
CHLORIDE SERPL-SCNC: 98 MMOL/L (ref 100–108)
CHLORIDE SERPL-SCNC: 99 MMOL/L (ref 100–108)
CHLORIDE SERPL-SCNC: 99 MMOL/L (ref 100–108)
CO2 SERPL-SCNC: 16 MMOL/L (ref 21–32)
CO2 SERPL-SCNC: 20 MMOL/L (ref 21–32)
CO2 SERPL-SCNC: 21 MMOL/L (ref 21–32)
CO2 SERPL-SCNC: 22 MMOL/L (ref 21–32)
CO2 SERPL-SCNC: 23 MMOL/L (ref 21–32)
CO2 SERPL-SCNC: 23 MMOL/L (ref 21–32)
CO2 SERPL-SCNC: 25 MMOL/L (ref 21–32)
CO2 SERPL-SCNC: 27 MMOL/L (ref 21–32)
CORTIS AM PEAK SERPL-MCNC: 18.4 UG/DL (ref 4.2–22.4)
CREAT SERPL-MCNC: 1.08 MG/DL (ref 0.6–1.3)
CREAT SERPL-MCNC: 1.08 MG/DL (ref 0.6–1.3)
CREAT SERPL-MCNC: 1.09 MG/DL (ref 0.6–1.3)
CREAT SERPL-MCNC: 1.1 MG/DL (ref 0.6–1.3)
CREAT SERPL-MCNC: 1.17 MG/DL (ref 0.6–1.3)
CREAT SERPL-MCNC: 1.26 MG/DL (ref 0.6–1.3)
CREAT SERPL-MCNC: 1.3 MG/DL (ref 0.6–1.3)
CREAT SERPL-MCNC: 1.34 MG/DL (ref 0.6–1.3)
CREAT SERPL-MCNC: 1.36 MG/DL (ref 0.6–1.3)
CREAT SERPL-MCNC: 1.94 MG/DL (ref 0.6–1.3)
EOSINOPHIL # BLD AUTO: 0.01 THOUSAND/ΜL (ref 0–0.61)
EOSINOPHIL # BLD AUTO: 0.01 THOUSAND/ΜL (ref 0–0.61)
EOSINOPHIL # BLD MANUAL: 0 THOUSAND/UL (ref 0–0.4)
EOSINOPHIL # BLD MANUAL: 0 THOUSAND/UL (ref 0–0.4)
EOSINOPHIL NFR BLD AUTO: 0 % (ref 0–6)
EOSINOPHIL NFR BLD AUTO: 0 % (ref 0–6)
EOSINOPHIL NFR BLD MANUAL: 0 % (ref 0–6)
EOSINOPHIL NFR BLD MANUAL: 0 % (ref 0–6)
ERYTHROCYTE [DISTWIDTH] IN BLOOD BY AUTOMATED COUNT: 18.6 % (ref 11.6–15.1)
ERYTHROCYTE [DISTWIDTH] IN BLOOD BY AUTOMATED COUNT: 18.7 % (ref 11.6–15.1)
ERYTHROCYTE [DISTWIDTH] IN BLOOD BY AUTOMATED COUNT: 18.8 % (ref 11.6–15.1)
ERYTHROCYTE [DISTWIDTH] IN BLOOD BY AUTOMATED COUNT: 18.9 % (ref 11.6–15.1)
ERYTHROCYTE [DISTWIDTH] IN BLOOD BY AUTOMATED COUNT: 18.9 % (ref 11.6–15.1)
ERYTHROCYTE [DISTWIDTH] IN BLOOD BY AUTOMATED COUNT: 20 % (ref 11.6–15.1)
ERYTHROCYTE [DISTWIDTH] IN BLOOD BY AUTOMATED COUNT: 20.2 % (ref 11.6–15.1)
ERYTHROCYTE [DISTWIDTH] IN BLOOD BY AUTOMATED COUNT: 21.2 % (ref 11.6–15.1)
ERYTHROCYTE [DISTWIDTH] IN BLOOD BY AUTOMATED COUNT: 24.8 % (ref 11.6–15.1)
GFR SERPL CREATININE-BSD FRML MDRD: 27 ML/MIN/1.73SQ M
GFR SERPL CREATININE-BSD FRML MDRD: 41 ML/MIN/1.73SQ M
GFR SERPL CREATININE-BSD FRML MDRD: 42 ML/MIN/1.73SQ M
GFR SERPL CREATININE-BSD FRML MDRD: 44 ML/MIN/1.73SQ M
GFR SERPL CREATININE-BSD FRML MDRD: 45 ML/MIN/1.73SQ M
GFR SERPL CREATININE-BSD FRML MDRD: 50 ML/MIN/1.73SQ M
GFR SERPL CREATININE-BSD FRML MDRD: 54 ML/MIN/1.73SQ M
GFR SERPL CREATININE-BSD FRML MDRD: 54 ML/MIN/1.73SQ M
GFR SERPL CREATININE-BSD FRML MDRD: 55 ML/MIN/1.73SQ M
GFR SERPL CREATININE-BSD FRML MDRD: 55 ML/MIN/1.73SQ M
GLUCOSE P FAST SERPL-MCNC: 117 MG/DL (ref 65–99)
GLUCOSE SERPL-MCNC: 102 MG/DL (ref 65–140)
GLUCOSE SERPL-MCNC: 115 MG/DL (ref 65–140)
GLUCOSE SERPL-MCNC: 117 MG/DL (ref 65–140)
GLUCOSE SERPL-MCNC: 72 MG/DL (ref 65–140)
GLUCOSE SERPL-MCNC: 76 MG/DL (ref 65–140)
GLUCOSE SERPL-MCNC: 79 MG/DL (ref 65–140)
GLUCOSE SERPL-MCNC: 83 MG/DL (ref 65–140)
GLUCOSE SERPL-MCNC: 89 MG/DL (ref 65–140)
GLUCOSE SERPL-MCNC: 91 MG/DL (ref 65–140)
GLUCOSE SERPL-MCNC: 93 MG/DL (ref 65–140)
HCT VFR BLD AUTO: 34.7 % (ref 34.8–46.1)
HCT VFR BLD AUTO: 35.9 % (ref 34.8–46.1)
HCT VFR BLD AUTO: 36 % (ref 34.8–46.1)
HCT VFR BLD AUTO: 36.1 % (ref 34.8–46.1)
HCT VFR BLD AUTO: 36.2 % (ref 34.8–46.1)
HCT VFR BLD AUTO: 39.3 % (ref 34.8–46.1)
HCT VFR BLD AUTO: 40.1 % (ref 34.8–46.1)
HCT VFR BLD AUTO: 43.3 % (ref 34.8–46.1)
HCT VFR BLD AUTO: 43.4 % (ref 34.8–46.1)
HGB BLD-MCNC: 12.4 G/DL (ref 11.5–15.4)
HGB BLD-MCNC: 12.8 G/DL (ref 11.5–15.4)
HGB BLD-MCNC: 12.8 G/DL (ref 11.5–15.4)
HGB BLD-MCNC: 13 G/DL (ref 11.5–15.4)
HGB BLD-MCNC: 13.1 G/DL (ref 11.5–15.4)
HGB BLD-MCNC: 13.8 G/DL (ref 11.5–15.4)
HGB BLD-MCNC: 13.9 G/DL (ref 11.5–15.4)
HGB BLD-MCNC: 15.1 G/DL (ref 11.5–15.4)
HGB BLD-MCNC: 15.6 G/DL (ref 11.5–15.4)
IMM GRANULOCYTES # BLD AUTO: 0.01 THOUSAND/UL (ref 0–0.2)
IMM GRANULOCYTES # BLD AUTO: 0.07 THOUSAND/UL (ref 0–0.2)
IMM GRANULOCYTES NFR BLD AUTO: 0 % (ref 0–2)
IMM GRANULOCYTES NFR BLD AUTO: 1 % (ref 0–2)
INR PPP: 1.47 (ref 0.84–1.19)
LIPASE SERPL-CCNC: 161 U/L (ref 73–393)
LYMPHOCYTES # BLD AUTO: 0.29 THOUSAND/UL (ref 0.6–4.47)
LYMPHOCYTES # BLD AUTO: 0.34 THOUSAND/UL (ref 0.6–4.47)
LYMPHOCYTES # BLD AUTO: 0.85 THOUSANDS/ΜL (ref 0.6–4.47)
LYMPHOCYTES # BLD AUTO: 1.22 THOUSANDS/ΜL (ref 0.6–4.47)
LYMPHOCYTES # BLD AUTO: 7 % (ref 14–44)
LYMPHOCYTES # BLD AUTO: 9 % (ref 14–44)
LYMPHOCYTES NFR BLD AUTO: 15 % (ref 14–44)
LYMPHOCYTES NFR BLD AUTO: 16 % (ref 14–44)
MAGNESIUM SERPL-MCNC: 1.9 MG/DL (ref 1.6–2.6)
MAGNESIUM SERPL-MCNC: 2 MG/DL (ref 1.6–2.6)
MCH RBC QN AUTO: 31.6 PG (ref 26.8–34.3)
MCH RBC QN AUTO: 31.8 PG (ref 26.8–34.3)
MCH RBC QN AUTO: 32 PG (ref 26.8–34.3)
MCH RBC QN AUTO: 32.1 PG (ref 26.8–34.3)
MCH RBC QN AUTO: 32.2 PG (ref 26.8–34.3)
MCH RBC QN AUTO: 32.4 PG (ref 26.8–34.3)
MCH RBC QN AUTO: 32.7 PG (ref 26.8–34.3)
MCH RBC QN AUTO: 32.7 PG (ref 26.8–34.3)
MCH RBC QN AUTO: 34.2 PG (ref 26.8–34.3)
MCHC RBC AUTO-ENTMCNC: 34.7 G/DL (ref 31.4–37.4)
MCHC RBC AUTO-ENTMCNC: 34.9 G/DL (ref 31.4–37.4)
MCHC RBC AUTO-ENTMCNC: 35.1 G/DL (ref 31.4–37.4)
MCHC RBC AUTO-ENTMCNC: 35.5 G/DL (ref 31.4–37.4)
MCHC RBC AUTO-ENTMCNC: 35.7 G/DL (ref 31.4–37.4)
MCHC RBC AUTO-ENTMCNC: 35.7 G/DL (ref 31.4–37.4)
MCHC RBC AUTO-ENTMCNC: 35.9 G/DL (ref 31.4–37.4)
MCHC RBC AUTO-ENTMCNC: 36.1 G/DL (ref 31.4–37.4)
MCHC RBC AUTO-ENTMCNC: 36.2 G/DL (ref 31.4–37.4)
MCV RBC AUTO: 88 FL (ref 82–98)
MCV RBC AUTO: 89 FL (ref 82–98)
MCV RBC AUTO: 89 FL (ref 82–98)
MCV RBC AUTO: 90 FL (ref 82–98)
MCV RBC AUTO: 91 FL (ref 82–98)
MCV RBC AUTO: 92 FL (ref 82–98)
MCV RBC AUTO: 92 FL (ref 82–98)
MCV RBC AUTO: 93 FL (ref 82–98)
MCV RBC AUTO: 98 FL (ref 82–98)
MONOCYTES # BLD AUTO: 0.1 THOUSAND/UL (ref 0–1.22)
MONOCYTES # BLD AUTO: 0.12 THOUSAND/ΜL (ref 0.17–1.22)
MONOCYTES # BLD AUTO: 0.16 THOUSAND/ΜL (ref 0.17–1.22)
MONOCYTES # BLD AUTO: 0.25 THOUSAND/UL (ref 0–1.22)
MONOCYTES NFR BLD AUTO: 2 % (ref 4–12)
MONOCYTES NFR BLD AUTO: 2 % (ref 4–12)
MONOCYTES NFR BLD: 2 % (ref 4–12)
MONOCYTES NFR BLD: 8 % (ref 4–12)
NEUTROPHILS # BLD AUTO: 4.49 THOUSANDS/ΜL (ref 1.85–7.62)
NEUTROPHILS # BLD AUTO: 6.7 THOUSANDS/ΜL (ref 1.85–7.62)
NEUTROPHILS # BLD MANUAL: 2.63 THOUSAND/UL (ref 1.85–7.62)
NEUTROPHILS # BLD MANUAL: 4.3 THOUSAND/UL (ref 1.85–7.62)
NEUTS BAND NFR BLD MANUAL: 1 % (ref 0–8)
NEUTS SEG NFR BLD AUTO: 82 % (ref 43–75)
NEUTS SEG NFR BLD AUTO: 89 % (ref 43–75)
NRBC BLD AUTO-RTO: 0 /100 WBCS
OSMOLALITY UR/SERPL-RTO: 272 MMOL/KG (ref 282–298)
OSMOLALITY UR: 291 MMOL/KG
OSMOLALITY UR: 530 MMOL/KG
PLATELET # BLD AUTO: 103 THOUSANDS/UL (ref 149–390)
PLATELET # BLD AUTO: 123 THOUSANDS/UL (ref 149–390)
PLATELET # BLD AUTO: 138 THOUSANDS/UL (ref 149–390)
PLATELET # BLD AUTO: 172 THOUSANDS/UL (ref 149–390)
PLATELET # BLD AUTO: 21 THOUSANDS/UL (ref 149–390)
PLATELET # BLD AUTO: 67 THOUSANDS/UL (ref 149–390)
PLATELET # BLD AUTO: 71 THOUSANDS/UL (ref 149–390)
PLATELET # BLD AUTO: 85 THOUSANDS/UL (ref 149–390)
PLATELET # BLD AUTO: 88 THOUSANDS/UL (ref 149–390)
PLATELET BLD QL SMEAR: ABNORMAL
PLATELET BLD QL SMEAR: ABNORMAL
PMV BLD AUTO: 10.5 FL (ref 8.9–12.7)
PMV BLD AUTO: 10.6 FL (ref 8.9–12.7)
PMV BLD AUTO: 10.6 FL (ref 8.9–12.7)
PMV BLD AUTO: 10.7 FL (ref 8.9–12.7)
PMV BLD AUTO: 10.8 FL (ref 8.9–12.7)
PMV BLD AUTO: 10.9 FL (ref 8.9–12.7)
PMV BLD AUTO: 11.3 FL (ref 8.9–12.7)
PMV BLD AUTO: 11.8 FL (ref 8.9–12.7)
POIKILOCYTOSIS BLD QL SMEAR: PRESENT
POTASSIUM SERPL-SCNC: 2.9 MMOL/L (ref 3.5–5.3)
POTASSIUM SERPL-SCNC: 2.9 MMOL/L (ref 3.5–5.3)
POTASSIUM SERPL-SCNC: 3.1 MMOL/L (ref 3.5–5.3)
POTASSIUM SERPL-SCNC: 3.3 MMOL/L (ref 3.5–5.3)
POTASSIUM SERPL-SCNC: 3.4 MMOL/L (ref 3.5–5.3)
POTASSIUM SERPL-SCNC: 3.4 MMOL/L (ref 3.5–5.3)
POTASSIUM SERPL-SCNC: 3.8 MMOL/L (ref 3.5–5.3)
POTASSIUM SERPL-SCNC: 3.8 MMOL/L (ref 3.5–5.3)
POTASSIUM SERPL-SCNC: 3.9 MMOL/L (ref 3.5–5.3)
POTASSIUM SERPL-SCNC: 5 MMOL/L (ref 3.5–5.3)
PROT SERPL-MCNC: 4.7 G/DL (ref 6.4–8.2)
PROT SERPL-MCNC: 4.9 G/DL (ref 6.4–8.2)
PROT SERPL-MCNC: 5.2 G/DL (ref 6.4–8.2)
PROT SERPL-MCNC: 6.2 G/DL (ref 6.4–8.2)
PROTHROMBIN TIME: 17.1 SECONDS (ref 11.6–14.5)
RBC # BLD AUTO: 3.79 MILLION/UL (ref 3.81–5.12)
RBC # BLD AUTO: 3.95 MILLION/UL (ref 3.81–5.12)
RBC # BLD AUTO: 4.03 MILLION/UL (ref 3.81–5.12)
RBC # BLD AUTO: 4.04 MILLION/UL (ref 3.81–5.12)
RBC # BLD AUTO: 4.08 MILLION/UL (ref 3.81–5.12)
RBC # BLD AUTO: 4.22 MILLION/UL (ref 3.81–5.12)
RBC # BLD AUTO: 4.34 MILLION/UL (ref 3.81–5.12)
RBC # BLD AUTO: 4.41 MILLION/UL (ref 3.81–5.12)
RBC # BLD AUTO: 4.93 MILLION/UL (ref 3.81–5.12)
RBC MORPH BLD: NORMAL
RBC MORPH BLD: PRESENT
RH BLD: POSITIVE
SODIUM 24H UR-SCNC: 11 MOL/L
SODIUM 24H UR-SCNC: 11 MOL/L
SODIUM SERPL-SCNC: 127 MMOL/L (ref 136–145)
SODIUM SERPL-SCNC: 128 MMOL/L (ref 136–145)
SODIUM SERPL-SCNC: 128 MMOL/L (ref 136–145)
SODIUM SERPL-SCNC: 129 MMOL/L (ref 136–145)
SODIUM SERPL-SCNC: 129 MMOL/L (ref 136–145)
SODIUM SERPL-SCNC: 130 MMOL/L (ref 136–145)
SODIUM SERPL-SCNC: 131 MMOL/L (ref 136–145)
SODIUM SERPL-SCNC: 134 MMOL/L (ref 136–145)
SPECIMEN EXPIRATION DATE: NORMAL
TOTAL CELLS COUNTED SPEC: 100
TOTAL CELLS COUNTED SPEC: 100
TSH SERPL DL<=0.05 MIU/L-ACNC: 1.26 UIU/ML (ref 0.36–3.74)
UNIT DISPENSE STATUS: NORMAL
UNIT DISPENSE STATUS: NORMAL
UNIT PRODUCT CODE: NORMAL
UNIT PRODUCT CODE: NORMAL
UNIT RH: NORMAL
UNIT RH: NORMAL
URATE SERPL-MCNC: 3.2 MG/DL (ref 2–6.8)
VARIANT LYMPHS # BLD AUTO: 2 %
WBC # BLD AUTO: 2.93 THOUSAND/UL (ref 4.31–10.16)
WBC # BLD AUTO: 3.17 THOUSAND/UL (ref 4.31–10.16)
WBC # BLD AUTO: 3.33 THOUSAND/UL (ref 4.31–10.16)
WBC # BLD AUTO: 3.52 THOUSAND/UL (ref 4.31–10.16)
WBC # BLD AUTO: 3.94 THOUSAND/UL (ref 4.31–10.16)
WBC # BLD AUTO: 4.83 THOUSAND/UL (ref 4.31–10.16)
WBC # BLD AUTO: 5.43 THOUSAND/UL (ref 4.31–10.16)
WBC # BLD AUTO: 5.49 THOUSAND/UL (ref 4.31–10.16)
WBC # BLD AUTO: 8.19 THOUSAND/UL (ref 4.31–10.16)

## 2020-01-01 PROCEDURE — 99232 SBSQ HOSP IP/OBS MODERATE 35: CPT | Performed by: INTERNAL MEDICINE

## 2020-01-01 PROCEDURE — 99285 EMERGENCY DEPT VISIT HI MDM: CPT | Performed by: EMERGENCY MEDICINE

## 2020-01-01 PROCEDURE — 83930 ASSAY OF BLOOD OSMOLALITY: CPT | Performed by: INTERNAL MEDICINE

## 2020-01-01 PROCEDURE — 80048 BASIC METABOLIC PNL TOTAL CA: CPT | Performed by: INTERNAL MEDICINE

## 2020-01-01 PROCEDURE — 99215 OFFICE O/P EST HI 40 MIN: CPT | Performed by: INTERNAL MEDICINE

## 2020-01-01 PROCEDURE — 96402 CHEMO HORMON ANTINEOPL SQ/IM: CPT

## 2020-01-01 PROCEDURE — 74183 MRI ABD W/O CNTR FLWD CNTR: CPT

## 2020-01-01 PROCEDURE — 99231 SBSQ HOSP IP/OBS SF/LOW 25: CPT | Performed by: INTERNAL MEDICINE

## 2020-01-01 PROCEDURE — 99217 PR OBSERVATION CARE DISCHARGE MANAGEMENT: CPT | Performed by: INTERNAL MEDICINE

## 2020-01-01 PROCEDURE — 99220 PR INITIAL OBSERVATION CARE/DAY 70 MINUTES: CPT | Performed by: PHYSICIAN ASSISTANT

## 2020-01-01 PROCEDURE — 80053 COMPREHEN METABOLIC PANEL: CPT | Performed by: INTERNAL MEDICINE

## 2020-01-01 PROCEDURE — 83935 ASSAY OF URINE OSMOLALITY: CPT | Performed by: INTERNAL MEDICINE

## 2020-01-01 PROCEDURE — 83735 ASSAY OF MAGNESIUM: CPT | Performed by: INTERNAL MEDICINE

## 2020-01-01 PROCEDURE — 80048 BASIC METABOLIC PNL TOTAL CA: CPT | Performed by: PSYCHIATRY & NEUROLOGY

## 2020-01-01 PROCEDURE — 85007 BL SMEAR W/DIFF WBC COUNT: CPT | Performed by: INTERNAL MEDICINE

## 2020-01-01 PROCEDURE — 99239 HOSP IP/OBS DSCHRG MGMT >30: CPT | Performed by: INTERNAL MEDICINE

## 2020-01-01 PROCEDURE — 85027 COMPLETE CBC AUTOMATED: CPT | Performed by: PSYCHIATRY & NEUROLOGY

## 2020-01-01 PROCEDURE — 99284 EMERGENCY DEPT VISIT MOD MDM: CPT

## 2020-01-01 PROCEDURE — 36430 TRANSFUSION BLD/BLD COMPNT: CPT

## 2020-01-01 PROCEDURE — 85027 COMPLETE CBC AUTOMATED: CPT | Performed by: INTERNAL MEDICINE

## 2020-01-01 PROCEDURE — 84443 ASSAY THYROID STIM HORMONE: CPT | Performed by: INTERNAL MEDICINE

## 2020-01-01 PROCEDURE — 96360 HYDRATION IV INFUSION INIT: CPT

## 2020-01-01 PROCEDURE — 99233 SBSQ HOSP IP/OBS HIGH 50: CPT | Performed by: PHYSICIAN ASSISTANT

## 2020-01-01 PROCEDURE — 84550 ASSAY OF BLOOD/URIC ACID: CPT | Performed by: INTERNAL MEDICINE

## 2020-01-01 PROCEDURE — 82533 TOTAL CORTISOL: CPT | Performed by: INTERNAL MEDICINE

## 2020-01-01 PROCEDURE — 84300 ASSAY OF URINE SODIUM: CPT | Performed by: INTERNAL MEDICINE

## 2020-01-01 PROCEDURE — 86850 RBC ANTIBODY SCREEN: CPT

## 2020-01-01 PROCEDURE — 80053 COMPREHEN METABOLIC PANEL: CPT

## 2020-01-01 PROCEDURE — 83690 ASSAY OF LIPASE: CPT | Performed by: EMERGENCY MEDICINE

## 2020-01-01 PROCEDURE — 99220 PR INITIAL OBSERVATION CARE/DAY 70 MINUTES: CPT | Performed by: INTERNAL MEDICINE

## 2020-01-01 PROCEDURE — 80053 COMPREHEN METABOLIC PANEL: CPT | Performed by: EMERGENCY MEDICINE

## 2020-01-01 PROCEDURE — 99222 1ST HOSP IP/OBS MODERATE 55: CPT | Performed by: INTERNAL MEDICINE

## 2020-01-01 PROCEDURE — 86901 BLOOD TYPING SEROLOGIC RH(D): CPT

## 2020-01-01 PROCEDURE — 85610 PROTHROMBIN TIME: CPT | Performed by: EMERGENCY MEDICINE

## 2020-01-01 PROCEDURE — 74177 CT ABD & PELVIS W/CONTRAST: CPT

## 2020-01-01 PROCEDURE — 85027 COMPLETE CBC AUTOMATED: CPT

## 2020-01-01 PROCEDURE — ND001 PR NO DOCUMENTATION: Performed by: PHYSICIAN ASSISTANT

## 2020-01-01 PROCEDURE — 76377 3D RENDER W/INTRP POSTPROCES: CPT

## 2020-01-01 PROCEDURE — 86900 BLOOD TYPING SEROLOGIC ABO: CPT

## 2020-01-01 PROCEDURE — 85025 COMPLETE CBC W/AUTO DIFF WBC: CPT | Performed by: EMERGENCY MEDICINE

## 2020-01-01 PROCEDURE — 85007 BL SMEAR W/DIFF WBC COUNT: CPT

## 2020-01-01 PROCEDURE — P9037 PLATE PHERES LEUKOREDU IRRAD: HCPCS

## 2020-01-01 PROCEDURE — 85730 THROMBOPLASTIN TIME PARTIAL: CPT | Performed by: EMERGENCY MEDICINE

## 2020-01-01 PROCEDURE — 71275 CT ANGIOGRAPHY CHEST: CPT

## 2020-01-01 PROCEDURE — 85025 COMPLETE CBC W/AUTO DIFF WBC: CPT | Performed by: INTERNAL MEDICINE

## 2020-01-01 PROCEDURE — 99285 EMERGENCY DEPT VISIT HI MDM: CPT

## 2020-01-01 PROCEDURE — 36415 COLL VENOUS BLD VENIPUNCTURE: CPT

## 2020-01-01 PROCEDURE — 99255 IP/OBS CONSLTJ NEW/EST HI 80: CPT | Performed by: PHYSICIAN ASSISTANT

## 2020-01-01 PROCEDURE — A9585 GADOBUTROL INJECTION: HCPCS | Performed by: PHYSICIAN ASSISTANT

## 2020-01-01 RX ORDER — ATORVASTATIN CALCIUM 10 MG/1
10 TABLET, FILM COATED ORAL
Status: DISCONTINUED | OUTPATIENT
Start: 2020-01-01 | End: 2020-01-01

## 2020-01-01 RX ORDER — SODIUM CHLORIDE 9 MG/ML
20 INJECTION, SOLUTION INTRAVENOUS ONCE
Status: CANCELLED | OUTPATIENT
Start: 2020-01-01

## 2020-01-01 RX ORDER — SODIUM CHLORIDE 1000 MG
2 TABLET, SOLUBLE MISCELLANEOUS ONCE
Status: COMPLETED | OUTPATIENT
Start: 2020-01-01 | End: 2020-01-01

## 2020-01-01 RX ORDER — OXYCODONE HYDROCHLORIDE 5 MG/1
5 TABLET ORAL EVERY 4 HOURS PRN
Status: DISCONTINUED | OUTPATIENT
Start: 2020-01-01 | End: 2020-01-01

## 2020-01-01 RX ORDER — FUROSEMIDE 20 MG/1
20 TABLET ORAL DAILY
Status: DISCONTINUED | OUTPATIENT
Start: 2020-01-01 | End: 2020-01-01 | Stop reason: HOSPADM

## 2020-01-01 RX ORDER — SODIUM CHLORIDE 1000 MG
2 TABLET, SOLUBLE MISCELLANEOUS 2 TIMES DAILY WITH MEALS
Status: DISCONTINUED | OUTPATIENT
Start: 2020-01-01 | End: 2020-01-01

## 2020-01-01 RX ORDER — LAMOTRIGINE 25 MG/1
500 TABLET ORAL ONCE
Status: COMPLETED | OUTPATIENT
Start: 2020-01-01 | End: 2020-01-01

## 2020-01-01 RX ORDER — SODIUM CHLORIDE 1000 MG
1 TABLET, SOLUBLE MISCELLANEOUS
Status: DISCONTINUED | OUTPATIENT
Start: 2020-01-01 | End: 2020-01-01 | Stop reason: HOSPADM

## 2020-01-01 RX ORDER — BUSPIRONE HYDROCHLORIDE 5 MG/1
15 TABLET ORAL DAILY
Status: DISCONTINUED | OUTPATIENT
Start: 2020-01-01 | End: 2020-01-01 | Stop reason: HOSPADM

## 2020-01-01 RX ORDER — OXYCODONE HYDROCHLORIDE 5 MG/1
10 TABLET ORAL EVERY 6 HOURS PRN
Qty: 30 TABLET | Refills: 0 | Status: SHIPPED | OUTPATIENT
Start: 2020-01-01 | End: 2020-01-01 | Stop reason: HOSPADM

## 2020-01-01 RX ORDER — POTASSIUM CHLORIDE 20 MEQ/1
20 TABLET, EXTENDED RELEASE ORAL ONCE
Status: COMPLETED | OUTPATIENT
Start: 2020-01-01 | End: 2020-01-01

## 2020-01-01 RX ORDER — POTASSIUM CHLORIDE 20 MEQ/1
40 TABLET, EXTENDED RELEASE ORAL ONCE
Status: DISCONTINUED | OUTPATIENT
Start: 2020-01-01 | End: 2020-01-01

## 2020-01-01 RX ORDER — POTASSIUM CHLORIDE 14.9 MG/ML
20 INJECTION INTRAVENOUS
Status: DISPENSED | OUTPATIENT
Start: 2020-01-01 | End: 2020-01-01

## 2020-01-01 RX ORDER — POTASSIUM CHLORIDE 750 MG/1
10 TABLET, EXTENDED RELEASE ORAL DAILY
Status: DISCONTINUED | OUTPATIENT
Start: 2020-01-01 | End: 2020-01-01 | Stop reason: HOSPADM

## 2020-01-01 RX ORDER — SCOLOPAMINE TRANSDERMAL SYSTEM 1 MG/1
1 PATCH, EXTENDED RELEASE TRANSDERMAL
Status: DISCONTINUED | OUTPATIENT
Start: 2020-01-01 | End: 2020-01-23 | Stop reason: HOSPADM

## 2020-01-01 RX ORDER — SODIUM CHLORIDE 9 MG/ML
20 INJECTION, SOLUTION INTRAVENOUS ONCE
Status: COMPLETED | OUTPATIENT
Start: 2020-01-01 | End: 2020-01-01

## 2020-01-01 RX ORDER — POTASSIUM CHLORIDE 20 MEQ/1
40 TABLET, EXTENDED RELEASE ORAL ONCE
Status: COMPLETED | OUTPATIENT
Start: 2020-01-01 | End: 2020-01-01

## 2020-01-01 RX ORDER — METOCLOPRAMIDE 10 MG/1
10 TABLET ORAL 4 TIMES DAILY PRN
Status: DISCONTINUED | OUTPATIENT
Start: 2020-01-01 | End: 2020-01-01

## 2020-01-01 RX ORDER — POTASSIUM CHLORIDE 14.9 MG/ML
20 INJECTION INTRAVENOUS
Status: COMPLETED | OUTPATIENT
Start: 2020-01-01 | End: 2020-01-01

## 2020-01-01 RX ORDER — MORPHINE SULFATE 4 MG/ML
4 INJECTION, SOLUTION INTRAMUSCULAR; INTRAVENOUS ONCE
Status: DISCONTINUED | OUTPATIENT
Start: 2020-01-01 | End: 2020-01-01

## 2020-01-01 RX ORDER — LORAZEPAM 2 MG/ML
0.5 INJECTION INTRAMUSCULAR
Status: DISCONTINUED | OUTPATIENT
Start: 2020-01-01 | End: 2020-01-01

## 2020-01-01 RX ORDER — LAMOTRIGINE 25 MG/1
500 TABLET ORAL ONCE
Status: CANCELLED | OUTPATIENT
Start: 2020-01-23

## 2020-01-01 RX ORDER — MORPHINE SULFATE 4 MG/ML
4 INJECTION, SOLUTION INTRAMUSCULAR; INTRAVENOUS ONCE
Status: COMPLETED | OUTPATIENT
Start: 2020-01-01 | End: 2020-01-01

## 2020-01-01 RX ORDER — SODIUM CHLORIDE, SODIUM GLUCONATE, SODIUM ACETATE, POTASSIUM CHLORIDE, MAGNESIUM CHLORIDE, SODIUM PHOSPHATE, DIBASIC, AND POTASSIUM PHOSPHATE .53; .5; .37; .037; .03; .012; .00082 G/100ML; G/100ML; G/100ML; G/100ML; G/100ML; G/100ML; G/100ML
50 INJECTION, SOLUTION INTRAVENOUS CONTINUOUS
Status: DISPENSED | OUTPATIENT
Start: 2020-01-01 | End: 2020-01-01

## 2020-01-01 RX ORDER — SODIUM CHLORIDE 1000 MG
1 TABLET, SOLUBLE MISCELLANEOUS
Qty: 90 TABLET | Refills: 0 | Status: SHIPPED | OUTPATIENT
Start: 2020-01-01 | End: 2020-01-01 | Stop reason: HOSPADM

## 2020-01-01 RX ORDER — POTASSIUM CHLORIDE 750 MG/1
10 TABLET, EXTENDED RELEASE ORAL DAILY
Qty: 30 TABLET | Refills: 0 | Status: SHIPPED | OUTPATIENT
Start: 2020-01-01 | End: 2020-01-01 | Stop reason: HOSPADM

## 2020-01-01 RX ORDER — FUROSEMIDE 10 MG/ML
20 INJECTION INTRAMUSCULAR; INTRAVENOUS ONCE
Status: COMPLETED | OUTPATIENT
Start: 2020-01-01 | End: 2020-01-01

## 2020-01-01 RX ORDER — FAMOTIDINE 20 MG/1
20 TABLET, FILM COATED ORAL DAILY PRN
Status: DISCONTINUED | OUTPATIENT
Start: 2020-01-01 | End: 2020-01-01 | Stop reason: HOSPADM

## 2020-01-01 RX ORDER — SODIUM BICARBONATE 650 MG/1
650 TABLET ORAL
Status: DISCONTINUED | OUTPATIENT
Start: 2020-01-01 | End: 2020-01-01

## 2020-01-01 RX ORDER — GLYCOPYRROLATE 0.2 MG/ML
0.2 INJECTION INTRAMUSCULAR; INTRAVENOUS
Status: DISCONTINUED | OUTPATIENT
Start: 2020-01-01 | End: 2020-01-23 | Stop reason: HOSPADM

## 2020-01-01 RX ORDER — POTASSIUM CHLORIDE 20 MEQ/1
20 TABLET, EXTENDED RELEASE ORAL ONCE
Status: DISCONTINUED | OUTPATIENT
Start: 2020-01-01 | End: 2020-01-01

## 2020-01-01 RX ORDER — ONDANSETRON 2 MG/ML
4 INJECTION INTRAMUSCULAR; INTRAVENOUS EVERY 6 HOURS PRN
Status: DISCONTINUED | OUTPATIENT
Start: 2020-01-01 | End: 2020-01-01 | Stop reason: HOSPADM

## 2020-01-01 RX ORDER — ZOLPIDEM TARTRATE 5 MG/1
2.5 TABLET ORAL
Status: DISCONTINUED | OUTPATIENT
Start: 2020-01-01 | End: 2020-01-01 | Stop reason: HOSPADM

## 2020-01-01 RX ORDER — POTASSIUM CHLORIDE 20 MEQ/1
40 TABLET, EXTENDED RELEASE ORAL 2 TIMES DAILY
Status: DISCONTINUED | OUTPATIENT
Start: 2020-01-01 | End: 2020-01-01 | Stop reason: HOSPADM

## 2020-01-01 RX ORDER — LORAZEPAM 2 MG/ML
0.5 INJECTION INTRAMUSCULAR EVERY 4 HOURS PRN
Status: DISCONTINUED | OUTPATIENT
Start: 2020-01-01 | End: 2020-01-23 | Stop reason: HOSPADM

## 2020-01-01 RX ORDER — SODIUM CHLORIDE 9 MG/ML
100 INJECTION, SOLUTION INTRAVENOUS CONTINUOUS
Status: DISCONTINUED | OUTPATIENT
Start: 2020-01-01 | End: 2020-01-01

## 2020-01-01 RX ORDER — FUROSEMIDE 20 MG/1
20 TABLET ORAL DAILY
Qty: 30 TABLET | Refills: 0 | Status: SHIPPED | OUTPATIENT
Start: 2020-01-01 | End: 2020-01-01 | Stop reason: HOSPADM

## 2020-01-01 RX ORDER — POTASSIUM CHLORIDE 29.8 MG/ML
40 INJECTION INTRAVENOUS ONCE
Status: DISCONTINUED | OUTPATIENT
Start: 2020-01-01 | End: 2020-01-01

## 2020-01-01 RX ORDER — ACETAMINOPHEN 325 MG/1
325 TABLET ORAL EVERY 8 HOURS PRN
Status: COMPLETED | OUTPATIENT
Start: 2020-01-01 | End: 2020-01-01

## 2020-01-01 RX ORDER — FAMOTIDINE 10 MG
40 TABLET ORAL 2 TIMES DAILY PRN
COMMUNITY
End: 2020-01-01 | Stop reason: HOSPADM

## 2020-01-01 RX ADMIN — BUSPIRONE HYDROCHLORIDE 15 MG: 5 TABLET ORAL at 21:13

## 2020-01-01 RX ADMIN — ACETAMINOPHEN 325 MG: 325 TABLET, FILM COATED ORAL at 20:34

## 2020-01-01 RX ADMIN — SODIUM CHLORIDE 100 ML/HR: 0.9 INJECTION, SOLUTION INTRAVENOUS at 20:13

## 2020-01-01 RX ADMIN — METOCLOPRAMIDE HYDROCHLORIDE 10 MG: 10 TABLET ORAL at 12:20

## 2020-01-01 RX ADMIN — ONDANSETRON 4 MG: 2 INJECTION INTRAMUSCULAR; INTRAVENOUS at 12:47

## 2020-01-01 RX ADMIN — SODIUM BICARBONATE 650 MG TABLET 650 MG: at 08:30

## 2020-01-01 RX ADMIN — BUSPIRONE HYDROCHLORIDE 15 MG: 5 TABLET ORAL at 20:38

## 2020-01-01 RX ADMIN — METOCLOPRAMIDE HYDROCHLORIDE 10 MG: 10 TABLET ORAL at 15:49

## 2020-01-01 RX ADMIN — POTASSIUM CHLORIDE 20 MEQ: 14.9 INJECTION, SOLUTION INTRAVENOUS at 12:00

## 2020-01-01 RX ADMIN — LORAZEPAM 0.5 MG: 2 INJECTION INTRAMUSCULAR; INTRAVENOUS at 02:54

## 2020-01-01 RX ADMIN — ENOXAPARIN SODIUM 60 MG: 60 INJECTION SUBCUTANEOUS at 06:42

## 2020-01-01 RX ADMIN — FUROSEMIDE 20 MG: 20 TABLET ORAL at 08:30

## 2020-01-01 RX ADMIN — LORAZEPAM 0.5 MG: 2 INJECTION INTRAMUSCULAR; INTRAVENOUS at 17:44

## 2020-01-01 RX ADMIN — FULVESTRANT 500 MG: 50 INJECTION, SOLUTION INTRAMUSCULAR at 14:28

## 2020-01-01 RX ADMIN — MORPHINE SULFATE 2 MG: 2 INJECTION, SOLUTION INTRAMUSCULAR; INTRAVENOUS at 17:52

## 2020-01-01 RX ADMIN — MORPHINE SULFATE 2 MG: 2 INJECTION, SOLUTION INTRAMUSCULAR; INTRAVENOUS at 17:04

## 2020-01-01 RX ADMIN — POTASSIUM CHLORIDE 20 MEQ: 14.9 INJECTION, SOLUTION INTRAVENOUS at 21:56

## 2020-01-01 RX ADMIN — Medication 2 G: at 08:30

## 2020-01-01 RX ADMIN — BUSPIRONE HYDROCHLORIDE 15 MG: 5 TABLET ORAL at 21:25

## 2020-01-01 RX ADMIN — SODIUM CHLORIDE, SODIUM GLUCONATE, SODIUM ACETATE, POTASSIUM CHLORIDE, MAGNESIUM CHLORIDE, SODIUM PHOSPHATE, DIBASIC, AND POTASSIUM PHOSPHATE 50 ML/HR: .53; .5; .37; .037; .03; .012; .00082 INJECTION, SOLUTION INTRAVENOUS at 15:57

## 2020-01-01 RX ADMIN — Medication 2 G: at 11:38

## 2020-01-01 RX ADMIN — SODIUM BICARBONATE 650 MG TABLET 650 MG: at 18:16

## 2020-01-01 RX ADMIN — ENOXAPARIN SODIUM 40 MG: 40 INJECTION SUBCUTANEOUS at 08:30

## 2020-01-01 RX ADMIN — SODIUM BICARBONATE 650 MG TABLET 650 MG: at 09:25

## 2020-01-01 RX ADMIN — Medication 2 G: at 18:17

## 2020-01-01 RX ADMIN — SODIUM CHLORIDE 20 ML/HR: 0.9 INJECTION, SOLUTION INTRAVENOUS at 11:30

## 2020-01-01 RX ADMIN — FAMOTIDINE 20 MG: 20 TABLET, FILM COATED ORAL at 16:45

## 2020-01-01 RX ADMIN — ENOXAPARIN SODIUM 60 MG: 60 INJECTION SUBCUTANEOUS at 18:16

## 2020-01-01 RX ADMIN — METOCLOPRAMIDE HYDROCHLORIDE 10 MG: 10 TABLET ORAL at 19:01

## 2020-01-01 RX ADMIN — POTASSIUM CHLORIDE 20 MEQ: 1500 TABLET, EXTENDED RELEASE ORAL at 17:21

## 2020-01-01 RX ADMIN — POTASSIUM CHLORIDE 40 MEQ: 1500 TABLET, EXTENDED RELEASE ORAL at 09:18

## 2020-01-01 RX ADMIN — PALBOCICLIB 100 MG: 100 CAPSULE ORAL at 17:21

## 2020-01-01 RX ADMIN — METOCLOPRAMIDE HYDROCHLORIDE 10 MG: 10 TABLET ORAL at 09:05

## 2020-01-01 RX ADMIN — METOCLOPRAMIDE HYDROCHLORIDE 10 MG: 10 TABLET ORAL at 09:18

## 2020-01-01 RX ADMIN — PALBOCICLIB 100 MG: 100 CAPSULE ORAL at 16:55

## 2020-01-01 RX ADMIN — PALBOCICLIB 100 MG: 100 CAPSULE ORAL at 17:13

## 2020-01-01 RX ADMIN — METOCLOPRAMIDE HYDROCHLORIDE 10 MG: 10 TABLET ORAL at 11:25

## 2020-01-01 RX ADMIN — POTASSIUM CHLORIDE 20 MEQ: 14.9 INJECTION, SOLUTION INTRAVENOUS at 13:21

## 2020-01-01 RX ADMIN — BUSPIRONE HYDROCHLORIDE 15 MG: 5 TABLET ORAL at 22:22

## 2020-01-01 RX ADMIN — IODIXANOL 100 ML: 320 INJECTION, SOLUTION INTRAVASCULAR at 16:38

## 2020-01-01 RX ADMIN — PALBOCICLIB 100 MG: 100 CAPSULE ORAL at 17:14

## 2020-01-01 RX ADMIN — ZOLPIDEM TARTRATE 2.5 MG: 5 TABLET, FILM COATED ORAL at 21:37

## 2020-01-01 RX ADMIN — SODIUM CHLORIDE 100 ML/HR: 0.9 INJECTION, SOLUTION INTRAVENOUS at 05:26

## 2020-01-01 RX ADMIN — FAMOTIDINE 20 MG: 20 TABLET, FILM COATED ORAL at 20:04

## 2020-01-01 RX ADMIN — SODIUM CHLORIDE 1000 ML: 0.9 INJECTION, SOLUTION INTRAVENOUS at 16:52

## 2020-01-01 RX ADMIN — ENOXAPARIN SODIUM 40 MG: 40 INJECTION SUBCUTANEOUS at 08:52

## 2020-01-01 RX ADMIN — FAMOTIDINE 20 MG: 20 TABLET, FILM COATED ORAL at 16:55

## 2020-01-01 RX ADMIN — FAMOTIDINE 20 MG: 20 TABLET, FILM COATED ORAL at 19:50

## 2020-01-01 RX ADMIN — POTASSIUM CHLORIDE 20 MEQ: 14.9 INJECTION, SOLUTION INTRAVENOUS at 15:49

## 2020-01-01 RX ADMIN — PALBOCICLIB 100 MG: 100 CAPSULE ORAL at 16:13

## 2020-01-01 RX ADMIN — SODIUM BICARBONATE 650 MG TABLET 650 MG: at 11:50

## 2020-01-01 RX ADMIN — PALBOCICLIB 100 MG: 100 CAPSULE ORAL at 18:18

## 2020-01-01 RX ADMIN — SODIUM CHLORIDE, SODIUM GLUCONATE, SODIUM ACETATE, POTASSIUM CHLORIDE, MAGNESIUM CHLORIDE, SODIUM PHOSPHATE, DIBASIC, AND POTASSIUM PHOSPHATE 50 ML/HR: .53; .5; .37; .037; .03; .012; .00082 INJECTION, SOLUTION INTRAVENOUS at 11:25

## 2020-01-01 RX ADMIN — POTASSIUM CHLORIDE 40 MEQ: 1500 TABLET, EXTENDED RELEASE ORAL at 10:34

## 2020-01-01 RX ADMIN — SODIUM BICARBONATE 650 MG TABLET 650 MG: at 17:18

## 2020-01-01 RX ADMIN — SCOPALAMINE 1 PATCH: 1 PATCH, EXTENDED RELEASE TRANSDERMAL at 18:04

## 2020-01-01 RX ADMIN — ENOXAPARIN SODIUM 40 MG: 40 INJECTION SUBCUTANEOUS at 08:17

## 2020-01-01 RX ADMIN — FUROSEMIDE 20 MG: 10 INJECTION, SOLUTION INTRAMUSCULAR; INTRAVENOUS at 12:48

## 2020-01-01 RX ADMIN — ENOXAPARIN SODIUM 40 MG: 40 INJECTION SUBCUTANEOUS at 09:25

## 2020-01-01 RX ADMIN — GADOBUTROL 5 ML: 604.72 INJECTION INTRAVENOUS at 13:13

## 2020-01-01 RX ADMIN — SODIUM CHLORIDE, SODIUM GLUCONATE, SODIUM ACETATE, POTASSIUM CHLORIDE, MAGNESIUM CHLORIDE, SODIUM PHOSPHATE, DIBASIC, AND POTASSIUM PHOSPHATE 50 ML/HR: .53; .5; .37; .037; .03; .012; .00082 INJECTION, SOLUTION INTRAVENOUS at 15:49

## 2020-01-01 RX ADMIN — ACETAMINOPHEN 325 MG: 325 TABLET, FILM COATED ORAL at 00:21

## 2020-01-01 RX ADMIN — METOCLOPRAMIDE HYDROCHLORIDE 10 MG: 10 TABLET ORAL at 13:33

## 2020-01-01 RX ADMIN — ENOXAPARIN SODIUM 40 MG: 40 INJECTION SUBCUTANEOUS at 10:14

## 2020-01-01 RX ADMIN — IOHEXOL 85 ML: 350 INJECTION, SOLUTION INTRAVENOUS at 12:14

## 2020-01-01 RX ADMIN — SODIUM CHLORIDE 100 ML/HR: 0.9 INJECTION, SOLUTION INTRAVENOUS at 17:09

## 2020-01-01 RX ADMIN — MORPHINE SULFATE 2 MG: 2 INJECTION, SOLUTION INTRAMUSCULAR; INTRAVENOUS at 20:31

## 2020-01-01 RX ADMIN — ENOXAPARIN SODIUM 40 MG: 40 INJECTION SUBCUTANEOUS at 09:18

## 2020-01-01 RX ADMIN — FAMOTIDINE 20 MG: 20 TABLET, FILM COATED ORAL at 22:22

## 2020-01-01 RX ADMIN — MORPHINE SULFATE 4 MG: 4 INJECTION INTRAVENOUS at 22:33

## 2020-01-01 RX ADMIN — METOCLOPRAMIDE HYDROCHLORIDE 10 MG: 10 TABLET ORAL at 15:08

## 2020-01-01 RX ADMIN — LORAZEPAM 0.5 MG: 2 INJECTION INTRAMUSCULAR; INTRAVENOUS at 23:57

## 2020-01-01 RX ADMIN — POTASSIUM CHLORIDE 20 MEQ: 14.9 INJECTION, SOLUTION INTRAVENOUS at 11:38

## 2020-01-01 RX ADMIN — POTASSIUM CHLORIDE 40 MEQ: 1500 TABLET, EXTENDED RELEASE ORAL at 18:10

## 2020-01-02 PROBLEM — E87.6 HYPOKALEMIA: Status: ACTIVE | Noted: 2020-01-01

## 2020-01-02 PROBLEM — R79.89 ELEVATED SERUM CREATININE: Status: ACTIVE | Noted: 2020-01-01

## 2020-01-02 PROBLEM — E87.1 HYPONATREMIA: Status: ACTIVE | Noted: 2020-01-01

## 2020-01-02 PROBLEM — R79.89 ABNORMAL LFTS (LIVER FUNCTION TESTS): Status: ACTIVE | Noted: 2020-01-01

## 2020-01-02 NOTE — ASSESSMENT & PLAN NOTE
Most likely due to decreased oral intake and dehydration  Patient appears significantly dry on exam   Will hydrate with normal saline at 100 cc/hour  Repeat BMP tomorrow

## 2020-01-02 NOTE — ASSESSMENT & PLAN NOTE
Follows with Dr Claire Hale  Currently on Palbociclib daily  Now with worsening metastatic liver disease  Consult Oncology

## 2020-01-02 NOTE — ASSESSMENT & PLAN NOTE
Again due to decreased oral intake  Will replete with 40 mEq IV and 40 mg p o  For now  Recheck tomorrow  Monitor on 24 hour tele  Check magnesium

## 2020-01-02 NOTE — TELEPHONE ENCOUNTER
I spoke with patient today regarding her recent laboratory which showed bilirubin 6 0  She is known to have metastatic breast cancer to the liver  However, it is not clear if she has biliary obstruction due to the tumor or diffuse liver metastasis, without biliary obstruction  I recommended her to go to [facility] Emergency [facility] , then hospitalized, so that we can do quick imaging study and possibly ERCP with stent or percutaneous drainage if she has intrahepatic biliary dilatation  She understood

## 2020-01-02 NOTE — ASSESSMENT & PLAN NOTE
Patient with elevated AST, ALT, alk-phos and bilirubin  This has been increasing over last few weeks  No evidence of significant extra biliary obstruction on CT  Oncology contacted by ER  Recommended admitting patient for GI evaluation  Will consult GI  And will defer further investigations to them  Will hold off on MRCP for now  Trend LFTs

## 2020-01-02 NOTE — ED PROVIDER NOTES
History  Chief Complaint   Patient presents with    Abnormal Lab     Pt reports being sent over by oncologist for evaluation of abnormal blood work  Pt being treated for liver cancer  Dx December 2nd, 2019      61 yo female with active breast CA undergoing chemo presents with abnormal lab value  Pt with known hepatic mets to liver, was found to have markedly elevated bilirubin (6 0)  Pt without acute complaints, reports n /v last evening that has since resolved, denies jaundice or abdominal pain  Reports fatigue which she has attributed to active treatment  Denies runny nose, sore throat, cough, chest pain or  symptoms  Pt reports fatigue and increased shortness of breath that is mild  I       History provided by:  Patient, significant other and medical records   used: No    Evaluation of Abnormal Diagnostic Test   Time since result: This morning  Patient referred by:  Specialist  Resulting agency:  [facility]  Result type: LFT/amylase/lipase    LFT / amylase / lipase: Total bilirubin:  High      Prior to Admission Medications   Prescriptions Last Dose Informant Patient Reported? Taking?   atorvastatin (LIPITOR) 10 mg tablet  Self Yes No   Sig: Take by mouth   busPIRone (BUSPAR) 15 mg tablet  Self Yes No   Sig: Take 15 mg by mouth daily   metoclopramide (REGLAN) 10 mg tablet   No No   Sig: Take 1 tablet (10 mg total) by mouth 4 (four) times a day as needed (chemo induced nausea/vomiting)   ondansetron (ZOFRAN) 4 mg tablet   No No   Sig: Take 1 tablet (4 mg total) by mouth every 8 (eight) hours as needed for nausea or vomiting   palbociclib (IBRANCE) 100 MG capsule   No No   Sig: Take 1 capsule (100 mg total) by mouth daily Take with food   Take one tablet by mouth 3 weeks on, followed by 1 week off      Facility-Administered Medications: None       Past Medical History:   Diagnosis Date    Breast CA ([facility] [address] )     GERD (gastroesophageal reflux disease)     Hyperlipidemia        Past Surgical History:   Procedure Laterality Date    BREAST BIOPSY      MASTECTOMY Bilateral     RECONSTRUCTION BREAST W/ TRAM FLAP Right        Family History   Problem Relation Age of Onset    Hypertension Mother     Diabetes Mother     Heart disease Mother     Diabetes Father     Heart disease Father     Colon cancer Sister     Hypertension Sister      I have reviewed and agree with the history as documented  Social History     Tobacco Use    Smoking status: Never Smoker    Smokeless tobacco: Never Used   Substance Use Topics    Alcohol use: Not Currently     Comment: social    Drug use: No        Review of Systems   Constitutional: Positive for fatigue  Negative for chills and fever  HENT: Negative for rhinorrhea and sinus pressure  Respiratory: Positive for shortness of breath  Negative for chest tightness  Cardiovascular: Negative for chest pain and leg swelling  Gastrointestinal: Positive for nausea and vomiting  Negative for abdominal pain  Genitourinary: Negative for dysuria, frequency and urgency  Skin: Negative for color change and rash  All other systems reviewed and are negative  Physical Exam  Physical Exam   Constitutional: She is oriented to person, place, and time  She appears well-developed and well-nourished  HENT:   Head: Normocephalic and atraumatic  Eyes: Conjunctivae are normal  Scleral icterus is present  Neck: Normal range of motion  Cardiovascular: Normal rate, regular rhythm, normal heart sounds and intact distal pulses  No murmur heard  Pulmonary/Chest: Effort normal and breath sounds normal  No respiratory distress  Abdominal: Soft  There is no tenderness  Musculoskeletal: Normal range of motion  She exhibits no deformity  Neurological: She is alert and oriented to person, place, and time  Skin: Skin is warm and dry  She is not diaphoretic  Psychiatric: She has a normal mood and affect   Her behavior is normal  Judgment and thought content normal    Nursing note and vitals reviewed  Vital Signs  ED Triage Vitals [01/02/20 1407]   Temperature Pulse Respirations Blood Pressure SpO2   98 1 °F (36 7 °C) (!) 125 18 143/82 98 %      Temp Source Heart Rate Source Patient Position - Orthostatic VS BP Location FiO2 (%)   Oral Monitor Sitting Right arm --      Pain Score       No Pain           Vitals:    01/02/20 1407   BP: 143/82   Pulse: (!) 125   Patient Position - Orthostatic VS: Sitting         Visual Acuity      ED Medications  Medications - No data to display    Diagnostic Studies  Results Reviewed     None                 No orders to display              Procedures  Procedures         ED Course  ED Course as of Jan 08 1350   Thu Jan 02, 2020   1724 New and diffuse hepatic mets on imaging, new ascites as well  Will touch base with heme/onc  Anticipate admission  1726 Heme onc request admission, GI consult in AM>       1738 Pt with persistent tachycardia, anion gap and ELLIOT in setting of significant lab derangment and new findings of innumerable hepatic metastatic disease and new ascites/jaundice  Will admit for heme/onc/GI eval and further work up eval as indicated  MDM  Number of Diagnoses or Management Options  Hyponatremia:   Jaundice:   Metastatic disease [facility]):   Diagnosis management comments: Pt with progressive metastatic disease to liver, no obvious obstructing mass  Pt also with significant lab derangement  Admitted for GI consult, and correction of metabolic deranagement          Amount and/or Complexity of Data Reviewed  Clinical lab tests: ordered and reviewed  Tests in the radiology section of CPT®: ordered and reviewed  Tests in the medicine section of CPT®: ordered and reviewed  Discussion of test results with the performing providers: yes  Obtain history from someone other than the patient: yes  Review and summarize past medical records: yes  Discuss the patient with other providers: yes  Independent visualization of images, tracings, or specimens: yes    Risk of Complications, Morbidity, and/or Mortality  Presenting problems: moderate  Diagnostic procedures: moderate  Management options: moderate    Patient Progress  Patient progress: stable        Disposition  Final diagnoses:   None     ED Disposition     None      Follow-up Information    None         Patient's Medications   Discharge Prescriptions    No medications on file     No discharge procedures on file      ED Provider  Electronically Signed by           Abby Valencia MD  01/08/20 0176

## 2020-01-02 NOTE — H&P
H&P- Ellis  1955, 62 y o  female MRN: [id number]    Unit/Bed#: ED 15 Encounter: [id number]    Primary Care Provider: Fernando Kumar DO   Date and time admitted to hospital: 1/2/2020  2:11 PM        * Abnormal LFTs (liver function tests)  Assessment & Plan  Patient with elevated AST, ALT, alk-phos and bilirubin  This has been increasing over last few weeks  No evidence of significant extra biliary obstruction on CT  Oncology contacted by ER  Recommended admitting patient for GI evaluation  Will consult GI  And will defer further investigations to them  Will hold off on MRCP for now  Trend LFTs  Liver metastasis ([facility] [address] )  Assessment & Plan  Seen on the CT abdomen  GI consult per Oncology recommendation  Malignant neoplasm of right breast in female, estrogen receptor positive ([facility] [address] )  Assessment & Plan  Follows with Dr Latasha Frias  Currently on Palbociclib daily  Now with worsening metastatic liver disease  Consult Oncology  Elevated serum creatinine  Assessment & Plan  Since last 1 month  Was normal back in May  Most likely due to decreased oral intake from worsening metastatic disease  Monitor with IV hydration  Hypokalemia  Assessment & Plan  Again due to decreased oral intake  Will replete with 40 mEq IV and 40 mg p o  For now  Recheck tomorrow  Monitor on 24 hour tele  Check magnesium  Hyponatremia  Assessment & Plan  Most likely due to decreased oral intake and dehydration  Patient appears significantly dry on exam   Will hydrate with normal saline at 100 cc/hour  Repeat BMP tomorrow  VTE Prophylaxis: Enoxaparin (Lovenox)  / sequential compression device   Code Status: full  POLST: POLST form is not discussed and not completed at this time  Discussion with family:  at bedside    Anticipated Length of Stay:  Patient will be admitted on an Observation basis with an anticipated length of stay of  < 2 midnights     Justification for Hospital Stay: above    Total Time for Visit, including Counseling / Coordination of Care: 45 minutes  Greater than 50% of this total time spent on direct patient counseling and coordination of care  Chief Complaint:   Abnormal labs - sent by Oncologist    History of Present Illness:    Guera Shafer is a 62 y o  female who presents as advised by her oncologist because of abnormal blood tests  Patient has known history of metastatic breast cancer  Follows with Dr Deysi Abdul who has been monitoring her labs recently over last few weeks  Patient's LFTs has been slowly increasing along with electrolyte abnormalities  He did labs this morning that showed significant elevated bilirubin so patient was advised to come to the ER for GI evaluation for possible interventions in case she has obstruction  Patient currently denies any specific symptoms other than feeling very weak and tired  Says she does have appetite but feels full immediately after she eats very little  Her oral intake has been very poor  She has not had any bowel movement for last 2-3 days now  Denies any abdominal pain  Had nausea and vomiting yesterday but none today  Review of Systems:    Review of Systems   Constitutional: Positive for appetite change and fatigue  Negative for fever  HENT: Negative for congestion and sore throat  Respiratory: Negative for cough and shortness of breath  Cardiovascular: Negative for chest pain and palpitations  Gastrointestinal: Positive for constipation  Negative for abdominal pain, nausea and vomiting  Genitourinary: Negative for difficulty urinating, dysuria, flank pain and frequency  Musculoskeletal: Negative for arthralgias and myalgias  Skin: Positive for color change  Neurological: Negative for dizziness, weakness and light-headedness  Psychiatric/Behavioral: Negative for agitation, behavioral problems and confusion         Past Medical and Surgical History:     Past Medical History: Diagnosis Date    Breast CA ([facility] [address] )     GERD (gastroesophageal reflux disease)     Hyperlipidemia        Past Surgical History:   Procedure Laterality Date    BREAST BIOPSY      MASTECTOMY Bilateral     RECONSTRUCTION BREAST W/ TRAM FLAP Right        Meds/Allergies:    Prior to Admission medications    Medication Sig Start Date End Date Taking? Authorizing Provider   atorvastatin (LIPITOR) 10 mg tablet Take by mouth   Yes Historical Provider, MD   busPIRone (BUSPAR) 15 mg tablet Take 15 mg by mouth daily 3/24/18  Yes Historical Provider, MD   metoclopramide (REGLAN) 10 mg tablet Take 1 tablet (10 mg total) by mouth 4 (four) times a day as needed (chemo induced nausea/vomiting) 12/18/19  Yes Janett Ibarra MD   ondansetron (ZOFRAN) 4 mg tablet Take 1 tablet (4 mg total) by mouth every 8 (eight) hours as needed for nausea or vomiting 12/13/19  Yes Janett Ibarra MD   palbociclib (IBRANCE) 100 MG capsule Take 1 capsule (100 mg total) by mouth daily Take with food  Take one tablet by mouth 3 weeks on, followed by 1 week off 12/23/19  Yes Janett Ibarra MD       Allergies:    Allergies   Allergen Reactions    Percocet [Oxycodone-Acetaminophen]     Vancomycin        Social History:     Marital Status: /Civil Union   Occupation:   Patient Pre-hospital Living Situation:   Patient Pre-hospital Level of Mobility:   Patient Pre-hospital Diet Restrictions:   Substance Use History:   Social History     Substance and Sexual Activity   Alcohol Use Not Currently    Comment: social     Social History     Tobacco Use   Smoking Status Never Smoker   Smokeless Tobacco Never Used     Social History     Substance and Sexual Activity   Drug Use No       Family History:    Family History   Problem Relation Age of Onset    Hypertension Mother     Diabetes Mother     Heart disease Mother     Diabetes Father     Heart disease Father     Colon cancer Sister     Hypertension Sister        Physical Exam:     Vitals: Blood Pressure: 118/71 (01/02/20 1809)  Pulse: (!) 113 (01/02/20 1809)  Temperature: 98 1 °F (36 7 °C) (01/02/20 1407)  Temp Source: Oral (01/02/20 1407)  Respirations: 18 (01/02/20 1809)  Height: 5' 3" (160 cm) (01/02/20 1553)  SpO2: 97 % (01/02/20 1809)    Physical Exam    Constitutional: Pt appears comfortable  Not in any acute distress  Appears cachectic  HENT:   Head: Normocephalic and atraumatic  Eyes: EOM are normal  Icterus +  Neck: Neck supple  Cardiovascular: Normal rate, regular rhythm, normal heart sounds  No murmur heard  Pulmonary/Chest: Effort normal, air entry b/l equal  No respiratory distress  Pt has no wheezes or crackles  Abdominal: Soft  Non-distended, Non-tender  Bowel sounds are normal    Musculoskeletal: Normal range of motion  Neurological: alert and oriented to person, place, and time  Moving all extremities spontaneously  Psychiatric: normal mood and affect  Additional Data:     Lab Results: I have personally reviewed pertinent reports  Results from last 7 days   Lab Units 01/02/20  1440   WBC Thousand/uL 8 19   HEMOGLOBIN g/dL 15 6*   HEMATOCRIT % 43 4   PLATELETS Thousands/uL 172   NEUTROS PCT % 82*   LYMPHS PCT % 15   MONOS PCT % 2*   EOS PCT % 0     Results from last 7 days   Lab Units 01/02/20  1440   SODIUM mmol/L 128*   POTASSIUM mmol/L 2 9*   CHLORIDE mmol/L 92*   CO2 mmol/L 27   BUN mg/dL 16   CREATININE mg/dL 1 34*   ANION GAP mmol/L 9   CALCIUM mg/dL 10 3*   ALBUMIN g/dL 2 2*   TOTAL BILIRUBIN mg/dL 6 54*   ALK PHOS U/L 323*   ALT U/L 100*   AST U/L 273*   GLUCOSE RANDOM mg/dL 89     Results from last 7 days   Lab Units 01/02/20  1440   INR  1 47*                   Imaging: I have personally reviewed pertinent reports  CT abdomen pelvis with contrast   Final Result by Yoli Mann MD (01/02 1706)      Innumerable hepatic lesions have developed in the interval consistent with metastatic disease  Moderate abdominal pelvic ascites  Workstation performed: [id number]             Allscripts / [facility] Records Reviewed: Yes     ** Please Note: This note has been constructed using a voice recognition system   **

## 2020-01-02 NOTE — ASSESSMENT & PLAN NOTE
Since last 1 month  Was normal back in May  Most likely due to decreased oral intake from worsening metastatic disease  Monitor with IV hydration

## 2020-01-03 NOTE — ASSESSMENT & PLAN NOTE
· Most likely due to decreased oral intake and dehydration  · Patient appears significantly dry on exam   · Currently on normal saline at 100 cc/hour  · Repeat BMP pending

## 2020-01-03 NOTE — PLAN OF CARE
Problem: METABOLIC, FLUID AND ELECTROLYTES - ADULT  Goal: Electrolytes maintained within normal limits  Description  INTERVENTIONS:  - Monitor labs and assess patient for signs and symptoms of electrolyte imbalances  - Administer electrolyte replacement as ordered  - Monitor response to electrolyte replacements, including repeat lab results as appropriate  - Instruct patient on fluid and nutrition as appropriate  Outcome: Progressing  Goal: Fluid balance maintained  Description  INTERVENTIONS:  - Monitor labs   - Monitor I/O and WT  - Instruct patient on fluid and nutrition as appropriate  - Assess for signs & symptoms of volume excess or deficit  Outcome: Progressing     Problem: CARDIOVASCULAR - ADULT  Goal: Maintains optimal cardiac output and hemodynamic stability  Description  INTERVENTIONS:  - Monitor I/O, vital signs and rhythm  - Monitor for S/S and trends of decreased cardiac output  - Administer and titrate ordered vasoactive medications to optimize hemodynamic stability  - Assess quality of pulses, skin color and temperature  - Assess for signs of decreased coronary artery perfusion  - Instruct patient to report change in severity of symptoms  Outcome: Progressing  Goal: Absence of cardiac dysrhythmias or at baseline rhythm  Description  INTERVENTIONS:  - Continuous cardiac monitoring, vital signs, obtain 12 lead EKG if ordered  - Administer antiarrhythmic and heart rate control medications as ordered  - Monitor electrolytes and administer replacement therapy as ordered  Outcome: Progressing     Problem: DISCHARGE PLANNING  Goal: Discharge to home or other facility with appropriate resources  Description  INTERVENTIONS:  - Identify barriers to discharge w/patient and caregiver  - Arrange for needed discharge resources and transportation as appropriate  - Identify discharge learning needs (meds, wound care, etc )  - Arrange for interpretive services to assist at discharge as needed  - Refer to Case Management Department for coordinating discharge planning if the patient needs post-hospital services based on physician/advanced practitioner order or complex needs related to functional status, cognitive ability, or social support system  Outcome: Progressing

## 2020-01-03 NOTE — ASSESSMENT & PLAN NOTE
· K: 3 4   · Again due to decreased oral intake  · Will replete with 40 mEq IV and 40 mg p o  For now  · Monitor on 24 hour tele    · Magnesium 2 0

## 2020-01-03 NOTE — ED NOTES
Patient c/o nausea, requested "reglan, because I told them this works better"        Olivia Correa, RN  01/02/20 9876

## 2020-01-03 NOTE — ASSESSMENT & PLAN NOTE
· Follows with Dr Nydia Salcido  · Currently on Palbociclib daily  · Now with worsening metastatic liver disease    · Oncology consulted, awaiting recommendations

## 2020-01-03 NOTE — UTILIZATION REVIEW
Initial Clinical Review    Admission: Date/Time/Statement:  1/2/2020 1737 Observation and changed 1/3/2020 1155 inpatient re:  Patient will need > 2 midnight stay for persistently elevated bilirubin,  Increased hepatomegaly with increased hepatic lesions on CT, necessitating MRCP  Start   Ordered   01/03/20 1154  Inpatient Admission Once     Transfer Service: General Medicine       Question Answer Comment   Admitting Physician Ravin Liriano    Level of Care Med Surg    Estimated length of stay More than 2 Midnights    Certification I certify that inpatient services are medically necessary for this patient for a duration of greater than two midnights  See H&P and MD Progress Notes for additional information about the patient's course of treatment  01/03/20 1155       ED Arrival Information     Expected Arrival Acuity Means of Arrival Escorted By Service Admission Type    - 1/2/2020 13:52 Emergent Walk-In Spouse Hospitalist Emergency    Arrival Complaint    Abnormal Labs        Chief Complaint   Patient presents with    Abnormal Lab     Pt reports being sent over by oncologist for evaluation of abnormal blood work  Pt being treated for liver cancer  Dx December 2nd, 2019  Assessment/Plan:  this is a 62year old female from home to ED from oncology admitted as observation due to abnormal LFTs/electrolyte imbalance in setting of poor oral intake and worsening metastatic liver disease  Has active breast cancer with liver metastasis undergoing chemo  Patient presented due to elevated bilirubin of 6  Last evening with nausea and vomiting, has fatigue with increased shortness of breath  On exam has scleral icterus, tachycardic  Na 128, K 2 9 hemolyzed  Bun 16  Creatinine 1 34  Calcium 10 3      Alkaline phosphatase 323  Total bilirubin 6 54  Albumin 2 2  INR 1 47  CT abdomen showed hepatic lesions consistent with metastatic disease, ascites   GI consulted    Trend LFTs, replete K, telemetry and IVF in progress  On 1/3/2020 changed to inpatient:  Has persistent elevation in bilirubin with worsening liver metastasis  For MRCP  Has poor intake, K 3 4 and will replete  Continue IVF    1/3/2020 per GI -  Has elevated LFTs in setting of liver metastasis from breast cancer, Initially, no note of biliary dilatation to suggest obstruction  Her bilirubin went from 1 7 to 6 5 within a 2 week period  innumerable liver masses on CT  For MRCP without contrast due to ELLIOT    ED Triage Vitals [01/02/20 1407]   Temperature Pulse Respirations Blood Pressure SpO2   98 1 °F (36 7 °C) (!) 125 18 143/82 98 %      Temp Source Heart Rate Source Patient Position - Orthostatic VS BP Location FiO2 (%)   Oral Monitor Sitting Right arm --      Pain Score       No Pain        Wt Readings from Last 1 Encounters:   01/02/20 59 5 kg (131 lb 1 6 oz)     Additional Vital Signs:   01/03/20 0735  98 3 °F (36 8 °C)  97  16  130/67  --  96 %  None (Room air)  Lying   01/02/20 2207  97 9 °F (36 6 °C)  107Abnormal   18  129/74  95  94 %  None (Room air)  Lying   01/02/20 1937  97 6 °F (36 4 °C)  110Abnormal   18  144/83  105  98 %  None (Room air)         Pertinent Labs/Diagnostic Test Results:   1/2/2020- CT abdomen - Innumerable hepatic lesions have developed in the interval consistent with metastatic disease  Moderate abdominal pelvic ascites    1/3/2020- MRI abdomen/MRCP-No intrahepatic or extrahepatic bile ductal dilation   Normal CBD diameter  Numerous hepatic metastases are again seen  Multiple osseous metastases of the spine   Lower sternal metastasis   No pathologic fracture  Enlarged left para-aortic node, likely metastatic  Moderate volume of abdominal ascites      Results from last 7 days   Lab Units 01/02/20  1440 01/02/20  1116   WBC Thousand/uL 8 19 8 30   HEMOGLOBIN g/dL 15 6* 16 0*   HEMATOCRIT % 43 4 45 5   PLATELETS Thousands/uL 172 186   NEUTROS ABS Thousands/µL 6 70 6 14     Results from last 7 days Lab Units 01/02/20  1440 01/02/20  1116   SODIUM mmol/L 128* 130*   POTASSIUM mmol/L 2 9* 2 9*   CHLORIDE mmol/L 92* 92*   CO2 mmol/L 27 29   ANION GAP mmol/L 9 9   BUN mg/dL 16 15   CREATININE mg/dL 1 34* 1 55*   EGFR ml/min/1 73sq m 42 35   CALCIUM mg/dL 10 3* 10 3*   MAGNESIUM mg/dL 2 0  --      Results from last 7 days   Lab Units 01/02/20  1440 01/02/20  1116   AST U/L 273* 263*   ALT U/L 100* 98*   ALK PHOS U/L 323* 322*   TOTAL PROTEIN g/dL 6 2* 6 1*   ALBUMIN g/dL 2 2* 2 1*   TOTAL BILIRUBIN mg/dL 6 54* 6 00*     Results from last 7 days   Lab Units 01/02/20  1440 01/02/20  1116   GLUCOSE RANDOM mg/dL 89 90     Results from last 7 days   Lab Units 01/02/20  1440   PROTIME seconds 17 1*   INR  1 47*   PTT seconds 36     Results from last 7 days   Lab Units 01/02/20  1440   LIPASE u/L 161       ED Treatment:   Medication Administration from 01/02/2020 1352 to 01/02/2020 1936       Date/Time Order Dose Route Action Comments     01/02/2020 1652 sodium chloride 0 9 % bolus 1,000 mL 1,000 mL Intravenous New Bag      01/02/2020 1810 potassium chloride (K-DUR,KLOR-CON) CR tablet 40 mEq 40 mEq Oral Given      01/02/2020 1919 potassium chloride 20 mEq IVPB (premix)   Intravenous Restarted      01/02/2020 1901 metoclopramide (REGLAN) tablet 10 mg 10 mg Oral Given         Past Medical History:   Diagnosis Date    Breast CA ([place] [address] )     GERD (gastroesophageal reflux disease)     Hyperlipidemia      Present on Admission:   Liver metastasis ([place] [address] )    Admitting Diagnosis: Hyponatremia [E87 1]  Jaundice [R17]  Elevated liver enzymes [R74 8]  Abnormal laboratory test result [R89 9]  Metastatic disease ([place] [address] ) [C79 9]  Liver metastasis ([facility]) [C78 7]  Malignant neoplasm of right breast in female, estrogen receptor positive, unspecified site of breast ([place] [address] ) [C50 911, Z17 0]  Age/Sex: 62 y o  female  Admission Orders: 1/2/2020 1737 Observation and changed 1/3/2020 1155 to inpatient  Scheduled Medications:  Medications:  busPIRone 15 mg Oral Daily   enoxaparin 40 mg Subcutaneous Daily     Continuous IV Infusions:  sodium chloride 100 mL/hr Intravenous Continuous     PRN Meds:  famotidine 20 mg Oral Daily PRN   metoclopramide 10 mg Oral 4x Daily PRN     Telemetry    IP CONSULT TO ONCOLOGY  IP CONSULT TO GASTROENTEROLOGY    Network Utilization Review Department  [email or web address]  ATTENTION: Please call with any questions or concerns to [phone] and carefully listen to the prompts so that you are directed to the right person  All voicemails are confidential   Isabelle Junior all requests for admission clinical reviews, approved or denied determinations and any other requests to dedicated fax number below belonging to the campus where the patient is receiving treatment   List of dedicated fax numbers for the Facilities:  [address] DENIALS (Administrative/Medical Necessity) [phone]   [address] (Maternity/NICU/Pediatrics) [phone]   Mariajose Downey [phone]   [address]  [phone]   [facility] [phone]   [address] [place] [address]  [phone]   [address] [phone]   [facility]  [phone]   [address] [facility]  [address] [facility] [address] [facility] [phone]

## 2020-01-03 NOTE — ASSESSMENT & PLAN NOTE
· Patient with elevated AST, ALT, alk-phos and bilirubin  · This has been increasing over last few weeks  · No evidence of significant extra biliary obstruction on CT   · Oncology contacted by ER  Recommended admitting patient for GI evaluation  · GI consult appreciated, with the following recommendations:  · MRI/MRCP pending   · Trend LFTs

## 2020-01-03 NOTE — CONSULTS
Consultation - [facility]  Matilda Huizar 62 y o  female MRN: [id number]  Unit/Bed#: S -91 Encounter: [id number]         Reason for Consult / Principal Problem:  Elevated LFTs    HPI:  Melissa Cardoza is a 80-year-old female with history of metastatic breast cancer on chemotherapy who presented to the hospital by direction of her oncologist for elevated LFTs  Her LFTs are as follows:  , ALT 98, alkaline phosphatase 322, total bilirubin 6  Her LFTs began to advise in the middle of December  Her bilirubin was 1 7 at that time  CT on admission revealing innumerable hepatic lesions consistent with metastasis  Also, moderate ascites seen as well  Patient was seen at the bedside this morning eating breakfast   In terms of abdominal symptoms, she does complain of intermittent nausea  Otherwise, no abdominal pain  No prior history of liver disease to her knowledge  Review of Systems:    CONSTITUTIONAL: Denies any fever, chills, or rigors  Good appetite, and no recent weight loss  HEENT: No earache or tinnitus  Denies hearing loss or visual disturbances  CARDIOVASCULAR: No chest pain or palpitations  RESPIRATORY: Denies any cough, hemoptysis, shortness of breath or dyspnea on exertion  GASTROINTESTINAL: As noted in the History of Present Illness  GENITOURINARY: No problems with urination  Denies any hematuria or dysuria  NEUROLOGIC: No dizziness or vertigo, denies headaches  MUSCULOSKELETAL: Denies any muscle or joint pain  SKIN: Denies skin rashes or itching  ENDOCRINE: Denies excessive thirst  Denies intolerance to heat or cold  PSYCHOSOCIAL: Denies depression or anxiety  Denies any recent memory loss         Historical Information   Past Medical History:   Diagnosis Date    Breast CA ([facility] [address] )     GERD (gastroesophageal reflux disease)     Hyperlipidemia      Past Surgical History:   Procedure Laterality Date    BREAST BIOPSY      MASTECTOMY Bilateral     RECONSTRUCTION BREAST W/ TRAM FLAP Right      Social History   Social History     Substance and Sexual Activity   Alcohol Use Not Currently    Comment: social     Social History     Substance and Sexual Activity   Drug Use No     Social History     Tobacco Use   Smoking Status Never Smoker   Smokeless Tobacco Never Used     Family History   Problem Relation Age of Onset    Hypertension Mother     Diabetes Mother     Heart disease Mother     Diabetes Father     Heart disease Father     Colon cancer Sister     Hypertension Sister         Meds/Allergies     Current Facility-Administered Medications   Medication Dose Route Frequency    busPIRone (BUSPAR) tablet 15 mg  15 mg Oral Daily    enoxaparin (LOVENOX) subcutaneous injection 40 mg  40 mg Subcutaneous Daily    famotidine (PEPCID) tablet 20 mg  20 mg Oral Daily PRN    metoclopramide (REGLAN) tablet 10 mg  10 mg Oral 4x Daily PRN    sodium chloride 0 9 % infusion  100 mL/hr Intravenous Continuous       Allergies   Allergen Reactions    Percocet [Oxycodone-Acetaminophen]     Vancomycin          Objective     Blood pressure 130/67, pulse 97, temperature 98 3 °F (36 8 °C), temperature source Oral, resp  rate 16, height 5' 3" (1 6 m), weight 59 5 kg (131 lb 1 6 oz), last menstrual period 01/01/2011, SpO2 96 %  Intake/Output Summary (Last 24 hours) at 1/3/2020 1016  Last data filed at 1/3/2020 0526  Gross per 24 hour   Intake 2921 67 ml   Output --   Net 2921 67 ml         PHYSICAL EXAM:      General Appearance:   Alert and oriented x 3  Cooperative, and in no respiratory distress   HEENT:   Normocephalic, atraumatic, anicteric      Neck:  Supple, symmetrical, trachea midline   Lungs:   Clear to auscultation bilaterally; no rales, rhonchi or wheezing; respirations unlabored    Heart[de-identified]   S1 and S2 normal; regular rate and rhythm; no murmur, rub, or gallop     Abdomen:   Soft, non-tender, non-distended; normal bowel sounds; no masses, no organomegaly    Genitalia:   Deferred    Rectal:   Deferred    Extremities:  No cyanosis, clubbing or edema    Pulses:  2+ and symmetric all extremities    Skin:  Jaundice  Otherwise normal texture, turgor normal, no rashes or lesions    Lymph nodes:  No palpable cervical or supraclavicular lymphadenopathy        Lab Results:   Results from last 7 days   Lab Units 01/02/20  1440   WBC Thousand/uL 8 19   HEMOGLOBIN g/dL 15 6*   HEMATOCRIT % 43 4   PLATELETS Thousands/uL 172   NEUTROS PCT % 82*   LYMPHS PCT % 15   MONOS PCT % 2*   EOS PCT % 0     Results from last 7 days   Lab Units 01/02/20  1440   POTASSIUM mmol/L 2 9*   CHLORIDE mmol/L 92*   CO2 mmol/L 27   BUN mg/dL 16   CREATININE mg/dL 1 34*   CALCIUM mg/dL 10 3*   ALK PHOS U/L 323*   ALT U/L 100*   AST U/L 273*     Results from last 7 days   Lab Units 01/02/20  1440   INR  1 47*     Results from last 7 days   Lab Units 01/02/20  1440   LIPASE u/L 161       Imaging Studies: I have personally reviewed pertinent imaging studies  Ct Abdomen Pelvis With Contrast    Result Date: 1/2/2020  Impression: Innumerable hepatic lesions have developed in the interval consistent with metastatic disease  Moderate abdominal pelvic ascites  Workstation performed: [id number]       ASSESSMENT and PLAN:      1) Elevated LFTs in the setting of liver metastasis from breast cancer - Patient with innumerable liver masses on CT  Initially, no note of biliary dilatation to suggest obstruction  Her bilirubin went from 1 7 to 6 5 within a 2 week period  She is afebrile  No abdominal pain  She is currently undergoing chemotherapy  - Will perform MRCP without contrast given acute kidney injury  - Further recommendations based on above findings in terms of whether not intervention is required   - Continue to monitor LFTs closely      The patient was seen and examined by Dr Columba Kwok, all shelley medical decisions were made with Dr Milan Barrios    Thank you for allowing us to participate in the care of this pleasant patient  We will follow up with you closely

## 2020-01-03 NOTE — ASSESSMENT & PLAN NOTE
· Since last 1 month  Was normal back in May  · Most likely due to decreased oral intake from worsening metastatic disease  · Monitor with IV hydration

## 2020-01-03 NOTE — CONSULTS
Hematology/Oncology Consult   Kofi Martin 62 y o  female MRN: [id number]  Unit/Bed#: S -32 Encounter: [id number]      Hospital Physician Requesting Consult: Rama Calderon MD  Reason for Consult:  Metastatic breast cancer, hyperbilirubinemia  Hematology/Oncology Supervising Physician: Dr Salazar Cage    HPI: Kofi Martin is a 62y o  year old female with a history of  stage IIIA right breast cancer with invasive lobular histology , grade 2 ER/GA positive HER2 negative disease diagnosed in 2010 followed by mastectomy resulting in [place]  She subsequently received adjuvant chemotherapy with AC followed by paclitaxel for 5 positive lymph nodes  Additionally, she received 5 years of adjuvant hormonal therapy with tamoxifen prior to menopause  In August of 2016 pre menopause was confirmed and she was transitioned to anastrozole  In December 2019 she was found with hypercalcemia  Subsequent workup confirm metastatic disease in the liver ER 95% positive, GA negative, HER2 negative disease  She [facility] the [facility]    She transition her care to [facility] and consulted with Dr Cr Wetzel in Medical Oncology on 12/18/2019  He recommended fluvestrant + palbociclib  The time of his consultation, her LFTs were found to be elevated as follows:  -alkaline phosphatase 389, ,  , T bili 1 7  He followed her hepatic function closely and on 1/2/2020 remained elevated with alkaline phosphatase 322,  and ALT 98, T bili 6 54  He subsequently recommended hospital admission to evaluate for possible hepatic obstruction    On admission, alkaline phosphatase 323, ,  , T bili 6 54 lipase 161  CBC showed hemoglobin 16, WBC 8 3, platelets 573 K      Gastroenterology was consulted for further evaluation due to unclear etiology of elevated bilirubin/transaminases possibly due to biliary obstruction verses due to i hepatic injury in the setting of known hepatic involvement of metastatic breast cancer  Assessment/Plan:   #1 Metastatic breast cancer with hepatic involvement  #2 Elevated transaminases, hyperbilirubinemia, 2/2 biliary obstruction vs hepatic tumor burden  #3 Hypercalcemia, 2/2 #1  Patient has known hepatic involvement of metastatic breast cancer, overall poor prognosis  She is currently receiving palbociclib and fluvestrant under the care of Dr Morgan Magallanes  -we recommend to continue the current treatment regimen with palbociclib this admission (3 weeks on/1 week off regimen)  She will continue as scheduled outpatient fluvestrant, next dose 1/9  This was discussed directly with Bryan Rivera today, 1/3   -recommend follow GI recommendations if there is biliary obstruction and possibly stent placement, otherwise treatment for elevated transaminases/hyperbilirubinemia will continue to be systemic anticancer therapy  -recommend monitor serum calcium levels this admission due to the patient's history of hypercalcemia  She has had bisphosphonate approximately 1 month ago  She will continue this in the outpatient setting under the care of Dr Morgan Magallanes as recommended  Follow up with Dr Morgan Magallanes in the outpatient setting as scheduled 1/24/2020  Discussed with the primary team (RACHEL-Dr Adele Martin) 1/3/2020      Please contact us if you have any questions regarding this consult  Thank you for this consult  Subjective:  Rickey Palomares reports:     Review of Systems   Constitutional: Positive for fatigue  Negative for appetite change, chills, fever and unexpected weight change  HENT:   Negative for sore throat and trouble swallowing  Respiratory: Negative for cough and shortness of breath  Gastrointestinal: Negative for abdominal distention, abdominal pain, blood in stool, constipation, diarrhea, nausea and vomiting  Genitourinary: Negative for dysuria and hematuria  Musculoskeletal: Negative for arthralgias, gait problem and myalgias     Skin: Negative for rash  Neurological: Negative for gait problem, headaches, light-headedness and speech difficulty  Hematological: Does not bruise/bleed easily  Psychiatric/Behavioral: Negative for confusion  All other systems reviewed and are negative  The remainder of a 12 point review of systems was negative  Objective:  /74 (BP Location: Left arm)   Pulse 105   Temp 97 7 °F (36 5 °C) (Oral)   Resp 19   Ht 5' 3" (1 6 m)   Wt 59 5 kg (131 lb 1 6 oz)   LMP 01/01/2011 (Approximate)   SpO2 96%   BMI 23 22 kg/m²     Physical Exam   Constitutional: She is oriented to person, place, and time  No distress  HENT:   Head: Normocephalic and atraumatic  Mouth/Throat: No oropharyngeal exudate  Eyes: Conjunctivae and EOM are normal  Scleral icterus is present  Neck: Normal range of motion  Cardiovascular: Normal rate and normal heart sounds  Pulmonary/Chest: Effort normal and breath sounds normal  No respiratory distress  Abdominal: Soft  Bowel sounds are normal  She exhibits no distension  There is no tenderness  Musculoskeletal: She exhibits no edema or tenderness  Lymphadenopathy:     She has no cervical adenopathy  Neurological: She is alert and oriented to person, place, and time  Skin: Skin is warm  No rash noted  No pallor  Vitals reviewed           Current Facility-Administered Medications:     busPIRone (BUSPAR) tablet 15 mg, 15 mg, Oral, Daily, Gorge Santacruz MD, 15 mg at 01/02/20 2222    enoxaparin (LOVENOX) subcutaneous injection 40 mg, 40 mg, Subcutaneous, Daily, Gorge Santacruz MD, 40 mg at 01/03/20 1014    famotidine (PEPCID) tablet 20 mg, 20 mg, Oral, Daily PRN, RICARDA Orellana, 20 mg at 01/02/20 2222    metoclopramide (REGLAN) tablet 10 mg, 10 mg, Oral, 4x Daily PRN, Gorge Santacruz MD, 10 mg at 01/03/20 1333    palbociclib (IBRANCE) capsule 100 mg, 100 mg, Oral, Daily, Dinora Haywood MD    potassium chloride (K-DUR,KLOR-CON) CR tablet 20 mEq, 20 mEq, Oral, Once, Issa Russ MD    sodium chloride 0 9 % infusion, 100 mL/hr, Intravenous, Continuous, Oralia Monaco MD, Last Rate: 100 mL/hr at 01/03/20 0526, 100 mL/hr at 01/03/20 0526      Laboratory studies:  Recent Labs     01/02/20  1116 01/02/20  1440 01/03/20  1011   WBC 8 30 8 19 5 49   HGB 16 0* 15 6* 13 1    172 138*   MCV 90 88 89   RDW 19 4* 18 8* 18 7*   CREATININE 1 55* 1 34* 1 09   * 273* 192*   ALT 98* 100* 68         Laboratory studies were reviewed    Imaging Studies:    [unfilled]     Pathology: [unfilled]     Code Status: Level 1 - Full Code    Counseling / Coordination of Care  Total floor / unit time spent today 45 minutes  Greater than 50% of total time was spent with the patient and / or family counseling and / or coordination of care

## 2020-01-03 NOTE — PROGRESS NOTES
Progress Note - Salud Tsai 1955, 62 y o  female MRN: [id number]    Unit/Bed#: S -61 Encounter: [id number]    Primary Care Provider: Albertina Armendariz DO   Date and time admitted to hospital: 1/2/2020  2:11 PM    * Abnormal LFTs (liver function tests)  Assessment & Plan  · Patient with elevated AST, ALT, alk-phos and bilirubin  · This has been increasing over last few weeks  · No evidence of significant extra biliary obstruction on CT   · Oncology contacted by ER  Recommended admitting patient for GI evaluation  · GI consult appreciated, with the following recommendations:  · MRI/MRCP pending   · Trend LFTs  Malignant neoplasm of right breast in female, estrogen receptor positive ([facility] [address] )  Assessment & Plan  · Follows with Dr León Valdivia  · Currently on Palbociclib daily  · Now with worsening metastatic liver disease  · Oncology consulted, awaiting recommendations    Liver metastasis [facility])  Assessment & Plan  · Seen on the CT abdomen  · GI consult per Oncology recommendation  Elevated serum creatinine  Assessment & Plan  · Since last 1 month  Was normal back in May  · Most likely due to decreased oral intake from worsening metastatic disease  · Monitor with IV hydration  Hypokalemia  Assessment & Plan  · K: 3 4   · Again due to decreased oral intake  · Will replete with 40 mEq IV and 40 mg p o  For now  · Monitor on 24 hour tele  · Magnesium 2 0    Hyponatremia  Assessment & Plan  · Most likely due to decreased oral intake and dehydration  · Patient appears significantly dry on exam   · Currently on normal saline at 100 cc/hour  · Repeat BMP pending  VTE Pharmacologic Prophylaxis:   Pharmacologic: Enoxaparin (Lovenox)  Mechanical VTE Prophylaxis in Place: Yes    Discussions with Specialists or Other Care Team Provider: Discussed case with my attending  Oncology and GI on board      Education and Discussions with Family / Patient: I discussed the plan with the patient, all questions were answered  Patient was told to reach out if any further questions arise  Current Length of Stay: 0 day(s)    Current Patient Status: Inpatient     Discharge Plan / Estimated Discharge Date: In observation    Code Status: Level 1 - Full Code      Subjective:   Patient offers no complaints  No significant overnight events reported  Pertinent negatives include: Chest pain, palpitations, leg edema, dyspnea, cough, hemoptysis, nausea, vomiting, diarrhea, abdominal pain  Objective:     Vitals:   Temp (24hrs), Av 9 °F (36 6 °C), Min:97 6 °F (36 4 °C), Max:98 3 °F (36 8 °C)    Temp:  [97 6 °F (36 4 °C)-98 3 °F (36 8 °C)] 98 3 °F (36 8 °C)  HR:  [] 97  Resp:  [16-18] 16  BP: (118-144)/(67-83) 130/67  SpO2:  [94 %-98 %] 96 %  Body mass index is 23 22 kg/m²  Input and Output Summary (last 24 hours): Intake/Output Summary (Last 24 hours) at 1/3/2020 1416  Last data filed at 1/3/2020 0735  Gross per 24 hour   Intake 3161 67 ml   Output --   Net 3161 67 ml       Physical Exam:     Physical Exam   Constitutional: She is oriented to person, place, and time  She appears well-developed and well-nourished  HENT:   Head: Atraumatic  Eyes: Pupils are equal, round, and reactive to light  EOM are normal    Neck: Normal range of motion  Neck supple  Cardiovascular: Normal rate, regular rhythm, normal heart sounds and intact distal pulses  Exam reveals no gallop and no friction rub  No murmur heard  Pulmonary/Chest: Effort normal and breath sounds normal    Abdominal: Soft  Bowel sounds are normal  She exhibits no distension  There is no tenderness  Musculoskeletal: Normal range of motion  She exhibits no edema  Neurological: She is alert and oriented to person, place, and time  Skin: Skin is warm and dry  No pallor  Psychiatric: Her behavior is normal    Nursing note and vitals reviewed        Additional Data:     Labs:    Results from last 7 days   Lab Units 20  1011   WBC Thousand/uL 5 49   HEMOGLOBIN g/dL 13 1   HEMATOCRIT % 36 2   PLATELETS Thousands/uL 138*   NEUTROS PCT % 82*   LYMPHS PCT % 16   MONOS PCT % 2*   EOS PCT % 0     Results from last 7 days   Lab Units 01/03/20  1011   POTASSIUM mmol/L 3 4*   CHLORIDE mmol/L 100   CO2 mmol/L 23   BUN mg/dL 14   CREATININE mg/dL 1 09   CALCIUM mg/dL 8 4   ALK PHOS U/L 251*   ALT U/L 68   AST U/L 192*     Results from last 7 days   Lab Units 01/02/20  1440   INR  1 47*       * I Have Reviewed All Lab Data Listed Above  * Additional Pertinent Lab Tests Reviewed: All Labs Within Last 24 Hours Reviewed    Imaging:    Imaging Reports Reviewed Today Include: none  Imaging Personally Reviewed by Myself Includes:  none    Recent Cultures (last 7 days):           Last 24 Hours Medication List:     Current Facility-Administered Medications:  busPIRone 15 mg Oral Daily Beatrice Guillory MD    enoxaparin 40 mg Subcutaneous Daily Beatrice Guillory MD    famotidine 20 mg Oral Daily PRN RICARDA Subramanian    metoclopramide 10 mg Oral 4x Daily PRN Beatrice Guillory MD    palbociclib 100 mg Oral Daily Giana Young MD    potassium chloride 20 mEq Oral Once Gwendolyn Fleming MD    sodium chloride 100 mL/hr Intravenous Continuous Beatrice Guillory MD Last Rate: 100 mL/hr (01/03/20 0526)        Today, Patient Was Seen By: Gwendolyn Fleming MD    ** Please Note: This note has been constructed using a voice recognition system   **

## 2020-01-04 NOTE — CONSULTS
[address] [place] 62 y o  female MRN: [id number]  Unit/Bed#: S -70 Encounter: [id number]    ASSESSMENT and PLAN:    71-year-old female with a past medical history of metastatic breast cancer stage IIIA, status post mastectomy, with positive lymph nodes, status post chemotherapy, then treated with adjuvant hormonal therapy for 5 years, recent admission in December 2019 with hypercalcemia and was found to have metastatic disease to the liver, requiring intravenous infusions of fluid as an outpatient, who is now presenting with abnormal bilirubin levels  Nephrology is consulted for hyponatremia  1) hyponatremia    -initial sodium 130  -sodium has decreased to 128 with intravenous fluids  - patient gave a history of volume depletion initially but may have underlying SIADH in the setting of malignancy and also hypokalemia contributing  -urine osmolarity  -serum osmolarity  -urine sodium  -TSH  -hold fluids with normal saline  Give isolyte for 10 more hours   -check lab work as above in the morning   -give potassium repletion  -bicarbonate also lower today, hold normal saline  -eventually may require salt tablets with or without low-dose Lasix  -clinically patient does have signs of edema but intravascular as likely depleted given the hypotension, poor p o  Intake and history    2) hypokalemia    -would replete  -patient gets nauseous with oral repletion  Agrees to IV repletion with saline piggyback due to burning    3) renal function    -kidneys without hydronephrosis on CT scan    4) acid/base    -bicarbonate lower today    Repeat in the morning  -hold chloride   -changed to isolyte    5) metastatic malignancy    6) abnormal LFTs    -oncology on board  -gastroenterology on board  -patient completed MRI with contrast with no sign of bile ductal dilatation, numerous hepatic Mets, cautious metastatic disease, para-aortic node likely metastatic,    7) moderate ascites on MRI seen    HISTORY OF PRESENT ILLNESS:  Requesting Physician: Tommy Castellano MD  Reason for Consult:  Hyponatremia    Jagjit Rodriguez is a 62 y o  female who was admitted to [facility] after presenting with abnormal lab work from Hematology office on January 2nd  A renal consultation is requested today for assistance in the management of in hyponatremia  Patient states that at home has not been eating well  Has not been drinking well  Has had intermittent nausea and vomiting  No diarrhea  No fevers  No NSAID use  No recent new medications  Patient was admitted with abnormal LFTs and bilirubin levels and therefore the patient was admitted to the hospital      PAST MEDICAL HISTORY:  Past Medical History:   Diagnosis Date    Breast CA ([facility] [address] )     GERD (gastroesophageal reflux disease)     Hyperlipidemia        PAST SURGICAL HISTORY:  Past Surgical History:   Procedure Laterality Date    BREAST BIOPSY      MASTECTOMY Bilateral     RECONSTRUCTION BREAST W/ TRAM FLAP Right        ALLERGIES:  Allergies   Allergen Reactions    Percocet [Oxycodone-Acetaminophen]     Vancomycin        SOCIAL HISTORY:  Social History     Substance and Sexual Activity   Alcohol Use Not Currently    Comment: social     Social History     Substance and Sexual Activity   Drug Use No     Social History     Tobacco Use   Smoking Status Never Smoker   Smokeless Tobacco Never Used       FAMILY HISTORY:  Family History   Problem Relation Age of Onset    Hypertension Mother     Diabetes Mother     Heart disease Mother     Diabetes Father     Heart disease Father     Colon cancer Sister     Hypertension Sister        MEDICATIONS:    Current Facility-Administered Medications:     busPIRone (BUSPAR) tablet 15 mg, 15 mg, Oral, Daily, Tuan Roy MD, 15 mg at 01/03/20 2127    enoxaparin (LOVENOX) subcutaneous injection 40 mg, 40 mg, Subcutaneous, Daily, Tuan Roy MD, 40 mg at 01/04/20 1537   famotidine (PEPCID) tablet 20 mg, 20 mg, Oral, Daily PRN, RICARDA Steiner, 20 mg at 01/03/20 1950    metoclopramide (REGLAN) tablet 10 mg, 10 mg, Oral, 4x Daily PRN, Lauren Trujillo MD, 10 mg at 01/03/20 1333    palbociclib (IBRANCE) capsule 100 mg, 100 mg, Oral, Daily, Olya Chou MD, 100 mg at 01/03/20 1721    sodium chloride 0 9 % infusion, 100 mL/hr, Intravenous, Continuous, Lauren Trujillo MD, Last Rate: 100 mL/hr at 01/03/20 1709, 100 mL/hr at 01/03/20 1709    REVIEW OF SYSTEMS:     All the systems were reviewed and were negative except as documented on the HPI      PHYSICAL EXAM:  Current Weight: Weight - Scale: 59 5 kg (131 lb 1 6 oz)  First Weight: Weight - Scale: 59 5 kg (131 lb 1 6 oz)  Vitals:    01/03/20 0735 01/03/20 1510 01/03/20 2232 01/04/20 0820   BP: 130/67 133/74 119/72 109/75   BP Location: Left arm Left arm Left arm Left arm   Pulse: 97 105 103 (!) 110   Resp: 16 19 18 18   Temp: 98 3 °F (36 8 °C) 97 7 °F (36 5 °C) 98 3 °F (36 8 °C) 98 3 °F (36 8 °C)   TempSrc: Oral Oral Oral Axillary   SpO2: 96% 96% 95% 98%   Weight:       Height:           Intake/Output Summary (Last 24 hours) at 1/4/2020 1129  Last data filed at 1/4/2020 0900  Gross per 24 hour   Intake 240 ml   Output --   Net 240 ml     Physical Exam  General: NAD  Skin: no rash  Eyes: anicteric sclera  ENT: moist mucous membrane  Neck: supple  Chest: CTA b/l, no ronchii, no wheeze, no rubs, no rales  CVS: s1s2, no murmur, no gallop, no rub  Abdomen: soft, nontender, nl sounds  Extremities:  Anasarca  : no manrique  Neuro: AAOX3  Psych: normal affect      Invasive Devices:      Lab Results:   Results from last 7 days   Lab Units 01/04/20  0521 01/03/20  1011 01/02/20  1440 01/02/20  1116   WBC Thousand/uL 5 43 5 49 8 19 8 30   HEMOGLOBIN g/dL 13 9 13 1 15 6* 16 0*   HEMATOCRIT % 40 1 36 2 43 4 45 5   PLATELETS Thousands/uL 123* 138* 172 186   POTASSIUM mmol/L 3 4* 3 4* 2 9* 2 9*   CHLORIDE mmol/L 102 100 92* 92*   CO2 mmol/L 16* 23 27 29   BUN mg/dL 15 14 16 15   CREATININE mg/dL 1 17 1 09 1 34* 1 55*   CALCIUM mg/dL 8 7 8 4 10 3* 10 3*   MAGNESIUM mg/dL  --   --  2 0  --    ALK PHOS U/L  --  251* 323* 322*   ALT U/L  --  68 100* 98*   AST U/L  --  192* 273* 263*

## 2020-01-05 PROBLEM — R79.89 ELEVATED SERUM CREATININE: Status: RESOLVED | Noted: 2020-01-01 | Resolved: 2020-01-01

## 2020-01-05 PROBLEM — E87.6 HYPOKALEMIA: Status: RESOLVED | Noted: 2020-01-01 | Resolved: 2020-01-01

## 2020-01-05 NOTE — PROGRESS NOTES
Progress Note - Haris Holland 1955, 62 y o  female MRN: [id number]    Unit/Bed#: S -12 Encounter: [id number]    Primary Care Provider: Tasha Goff,    Date and time admitted to hospital: 1/2/2020  2:11 PM    [facility]  Assessment & Plan  · Follows with Dr Yeny Vázquez as outpatient   · Currently on Palbociclib daily  · Now with worsening metastatic liver disease  · Oncology consult appreciated with the following recommendations:   · Continue the current treatment regimen with palbociclib this admission (3 weeks on/1 week off regimen)  · She will continue as scheduled outpatient fluvestrant, next dose 1/9  · This was discussed directly with Mercado Carte today, 1/3  · Recommend follow GI recommendations if there is biliary obstruction and possibly stent placement, otherwise treatment for elevated transaminases/hyperbilirubinemia will continue to be systemic anticancer therapy  · Recommend monitor serum calcium levels this admission due to the patient's history of hypercalcemia  She has had bisphosphonate approximately 1 month ago  Liver metastasis ([facility] )  Assessment & Plan  · Seen on the CT abdomen  · MRI does not show any obstruction  · Will continue to follow LFTs  Hyponatremia  Assessment & Plan  · Now dropping further to 128 despite IVF  · Nephrology input noted - rechecking in am          Elevated serum creatinine-resolved as of 1/5/2020  Assessment & Plan  · Since last 1 month  Was normal back in May  · Most likely due to decreased oral intake from worsening metastatic disease  · Monitor with IV hydration    · Resolved at 1 29 today  · Continue to monitor    Hypokalemia-resolved as of 1/5/2020  Assessment & Plan  · K: 3 9  · Currently Resolved   · Was 3 4, will give 40 meq          VTE Pharmacologic Prophylaxis:   Pharmacologic: Enoxaparin (Lovenox)  Mechanical VTE Prophylaxis in Place: Yes    Discussions with Specialists or Other Care Team Provider: Discussed case with my attending, Nephrology and Oncology and GI on board  Education and Discussions with Family / Patient: I discussed the plan with the patient, all questions were answered  Patient was told to reach out if any further questions arise  Current Length of Stay: 2 day(s)    Current Patient Status: Inpatient     Discharge Plan / Estimated Discharge Date: To be determined     Code Status: Level 1 - Full Code      Subjective:   Patient offers no complaints  No significant overnight events reported  Pertinent negatives include: Chest pain, palpitations, leg edema, dyspnea, cough, hemoptysis, nausea, vomiting, diarrhea, abdominal pain  Objective:     Vitals:   Temp (24hrs), Av 1 °F (36 7 °C), Min:97 9 °F (36 6 °C), Max:98 2 °F (36 8 °C)    Temp:  [97 9 °F (36 6 °C)-98 2 °F (36 8 °C)] 98 1 °F (36 7 °C)  HR:  [101-110] 102  Resp:  [16-18] 18  BP: (118-128)/(60-80) 125/60  SpO2:  [96 %-97 %] 97 %  Body mass index is 23 22 kg/m²  Input and Output Summary (last 24 hours): Intake/Output Summary (Last 24 hours) at 2020 1007  Last data filed at 2020 0659  Gross per 24 hour   Intake 622 5 ml   Output --   Net 622 5 ml       Physical Exam:     Physical Exam   Constitutional: She appears well-developed and well-nourished  HENT:   Head: Atraumatic  Eyes: Pupils are equal, round, and reactive to light  EOM are normal    Neck: Normal range of motion  Neck supple  Cardiovascular: Normal rate, regular rhythm, normal heart sounds and intact distal pulses  Exam reveals no gallop and no friction rub  No murmur heard  Pulmonary/Chest: Effort normal and breath sounds normal    Abdominal: Soft  Bowel sounds are normal  She exhibits no distension  There is no tenderness  Musculoskeletal: Normal range of motion  She exhibits no edema  Skin: Skin is warm and dry  No pallor  Psychiatric: She has a normal mood and affect     Nursing note and vitals reviewed  Additional Data:     Labs:    Results from last 7 days   Lab Units 01/04/20  0521 01/03/20  1011   WBC Thousand/uL 5 43 5 49   HEMOGLOBIN g/dL 13 9 13 1   HEMATOCRIT % 40 1 36 2   PLATELETS Thousands/uL 123* 138*   NEUTROS PCT %  --  82*   LYMPHS PCT %  --  16   MONOS PCT %  --  2*   EOS PCT %  --  0     Results from last 7 days   Lab Units 01/04/20  0521 01/03/20  1011   POTASSIUM mmol/L 3 4* 3 4*   CHLORIDE mmol/L 102 100   CO2 mmol/L 16* 23   BUN mg/dL 15 14   CREATININE mg/dL 1 17 1 09   CALCIUM mg/dL 8 7 8 4   ALK PHOS U/L  --  251*   ALT U/L  --  68   AST U/L  --  192*     Results from last 7 days   Lab Units 01/02/20  1440   INR  1 47*       * I Have Reviewed All Lab Data Listed Above  * Additional Pertinent Lab Tests Reviewed: All Labs Within Last 24 Hours Reviewed    Imaging:    Imaging Reports Reviewed Today Include: none  Imaging Personally Reviewed by Myself Includes:  none    Recent Cultures (last 7 days):           Last 24 Hours Medication List:     Current Facility-Administered Medications:  acetaminophen 325 mg Oral Q8H PRN Divya Blanco MD   busPIRone 15 mg Oral Daily Jake Ojeda MD   enoxaparin 40 mg Subcutaneous Daily Jake Ojeda MD   famotidine 20 mg Oral Daily PRN RICARDA Noel   metoclopramide 10 mg Oral 4x Daily PRN Jake Ojeda MD   palbociclib 100 mg Oral Daily Divya Blanco MD        Today, Patient Was Seen By: Magui Wilson MD    ** Please Note: This note has been constructed using a voice recognition system   **

## 2020-01-05 NOTE — PROGRESS NOTES
[facility] PROGRESS NOTE   Rickey Jelani 62 y o  female MRN: [id number]  Unit/Bed#: S -09 Encounter: [id number]  Reason for Consult: hyponatremia    ASSESSMENT and PLAN:    78-year-old female with a past medical history of metastatic breast cancer stage IIIA, status post mastectomy, with positive lymph nodes, status post chemotherapy, then treated with adjuvant hormonal therapy for 5 years, recent admission in December 2019 with hypercalcemia and was found to have metastatic disease to the liver, requiring intravenous infusions of fluid as an outpatient, who is now presenting with abnormal bilirubin levels  Nephrology is consulted for hyponatremia     1) hyponatremia     -initial sodium 130  -sodium has decreased to 128 with intravenous fluids  - patient gave a history of volume depletion initially but may have underlying SIADH in the setting of malignancy and also hypokalemia contributing  -urine osmolarity  -serum osmolarity  -urine sodium  -TSH  -BMP  - all labs pending this AM due to diff obtaining labs this AM  In process  - please call if Na is decreasing  Thank you  Update - Na improving today (pt did receive IVF yest)  - can give 10 more hours of isolyte today 50 cc/hr for 10 hours  - reviewed with Primary Team Att who is already repleting potassium that is low     2) hypokalemia     -replete as needed     3) renal function     -kidneys without hydronephrosis on CT scan     4) acid/base     - await labs today     5) metastatic malignancy     6) abnormal LFTs     -oncology on board  -gastroenterology on board  -patient completed MRI with contrast with no sign of bile ductal dilatation, numerous hepatic Mets, cautious metastatic disease, para-aortic node likely metastatic,     7) moderate ascites on MRI seen    SUBJECTIVE / INTERVAL HISTORY:    Pt with back pain overnight       OBJECTIVE:  Current Weight: Weight - Scale: 59 5 kg (131 lb 1 6 oz)  Vitals:    01/04/20 1534 01/04/20 2106 01/04/20 2206 01/05/20 0659   BP: 128/73 118/71 124/80 125/60   BP Location: Left arm Left arm Left arm Left arm   Pulse: 101 (!) 110 (!) 107 102   Resp: 16  16 18   Temp: 97 9 °F (36 6 °C) 98 2 °F (36 8 °C) 98 °F (36 7 °C) 98 1 °F (36 7 °C)   TempSrc: Oral Oral Oral Oral   SpO2: 97% 96% 96% 97%   Weight:       Height:           Intake/Output Summary (Last 24 hours) at 1/5/2020 1005  Last data filed at 1/5/2020 3240  Gross per 24 hour   Intake 622 5 ml   Output --   Net 622 5 ml     General: NAD  Skin: no rash  Eyes: icteric sclera  ENT: moist mucous membrane  Neck: supple  Chest: CTA b/l, no ronchii, no wheeze, no rubs, no rales  CVS: s1s2, no murmur, no gallop, no rub  Abdomen: soft, nontender, nl sounds, slight distended  Extremities: 1+ edema LE b/l  : no manrique  Neuro: AAOX3  Psych: normal affect    Medications:    Current Facility-Administered Medications:     acetaminophen (TYLENOL) tablet 325 mg, 325 mg, Oral, Q8H PRN, Yumiko Ley MD, 325 mg at 01/04/20 2034    busPIRone (BUSPAR) tablet 15 mg, 15 mg, Oral, Daily, Beverly Real MD, 15 mg at 01/03/20 2125    enoxaparin (LOVENOX) subcutaneous injection 40 mg, 40 mg, Subcutaneous, Daily, Beverly Real MD, 40 mg at 01/05/20 0817    famotidine (PEPCID) tablet 20 mg, 20 mg, Oral, Daily PRN, RICARDA Suarez, 20 mg at 01/04/20 1655    metoclopramide (REGLAN) tablet 10 mg, 10 mg, Oral, 4x Daily PRN, Beverly Real MD, 10 mg at 01/04/20 1220    palbociclib (IBRANCE) capsule 100 mg, 100 mg, Oral, Daily, Yumiko Ley MD, 100 mg at 01/04/20 1655    Laboratory Results:  Results from last 7 days   Lab Units 01/04/20  0521 01/03/20  1011 01/02/20  1440 01/02/20  1116   WBC Thousand/uL 5 43 5 49 8 19 8 30   HEMOGLOBIN g/dL 13 9 13 1 15 6* 16 0*   HEMATOCRIT % 40 1 36 2 43 4 45 5   PLATELETS Thousands/uL 123* 138* 172 186   POTASSIUM mmol/L 3 4* 3 4* 2 9* 2 9*   CHLORIDE mmol/L 102 100 92* 92*   CO2 mmol/L 16* 23 27 29   BUN mg/dL 15 14 16 15   CREATININE mg/dL 1 17 1 09 1 34* 1 55*   CALCIUM mg/dL 8 7 8 4 10 3* 10 3*   MAGNESIUM mg/dL  --   --  2 0  --

## 2020-01-05 NOTE — ASSESSMENT & PLAN NOTE
· Follows with Dr León Valdivia as outpatient   · Currently on Palbociclib daily  · Now with worsening metastatic liver disease  · Oncology consult appreciated with the following recommendations:   · Continue the current treatment regimen with palbociclib this admission (3 weeks on/1 week off regimen)  · She will continue as scheduled outpatient fluvestrant, next dose 1/9  · This was discussed directly with Jennifer Rubio today, 1/3  · Recommend follow GI recommendations if there is biliary obstruction and possibly stent placement, otherwise treatment for elevated transaminases/hyperbilirubinemia will continue to be systemic anticancer therapy  · Recommend monitor serum calcium levels this admission due to the patient's history of hypercalcemia  She has had bisphosphonate approximately 1 month ago

## 2020-01-05 NOTE — ASSESSMENT & PLAN NOTE
· Since last 1 month  Was normal back in May  · Most likely due to decreased oral intake from worsening metastatic disease  · Monitor with IV hydration    · Resolved at 1 29 today  · Continue to monitor

## 2020-01-06 PROBLEM — R00.0 TACHYCARDIA: Status: ACTIVE | Noted: 2020-01-01

## 2020-01-06 NOTE — PROGRESS NOTES
Progress Note -  Hematology/Oncology   Ainsley Packer 62 y o  female MRN: [id number]  Unit/Bed#: S -84 Encounter: [id number]    Attending Hospital Physician: Hilda Dee MD  Date of Initial Hematology/Oncology Hospital Consultation:  1/3/2020  Reason for Consultation:  Metastatic breast cancer, hyper bilirubinemia    HPI: Ainsley Packer is a 62y o  year old female with a history of metastatic breast cancer with hepatic involvement who presented 1/2/2020 following outside laboratory study review of elevated transaminases with a hyperbilirubinemia T bili >6  Subsequent evaluation with GI consult and MR for evaluating whether there is an obstruction neck or possibly benefit from stenting  The other etiology was thought to be due to her known hepatic involvement of breast cancer  MR study did not show any obstructive  process and therefore the etiology is most consistent with metastatic disease  The treatment recommendation is to continue with her current systemic therapy and follow-up with Samara Zimmerman in the Medical Oncology outpatient office after hospital discharge       Please see initial hospital consult note dated 1/3/2020 for additional admission and oncologic history details  Assessment/Plan:   #1 Hyperbilirubinemia/transaminitis,  2/2 metastatic breast cancer  -patient will follow-up with Dr Emmy mcguire for ongoing treatment for targeting metastatic breast cancer  She is currently receiving palbociclib and fluvestrant, which we are recommending to continue  Her next infusion appointment is 1/9 and 1/23 and follow-up with Dr Crescencio Duncan on 1/29/2020  She understands to call the medical oncology office sooner if she has any questions concerns or new symptoms  #2 Tachycardia, unclear etiology  -patient has had elevated heart rate since admission, patient states this is due to stress and anxiety  She did not have any other symptoms such as palpitations or shortness of breathing    -CT PE study was performed 1/6/2020 to further evaluate for possible pulmonary embolus as etiology  The findings demonstrated enhancement in the upper lobes bilaterally consistent with age indeterminate right upper lobe PE with possibility remaining this is an acute PE  Patient denies a history of prior DVT or PE  However, given the active and progressing malignancy the chances of her acquiring a PE are very high and given possibility of acute or chronic existing PE we recommend therapeutic anticoagulation with an oral agent such Rivaroxaban or Apixaban  I have sent prescription to [facility] for co-pay evaluation  Recommend initiating therapeutic dosed Lovenox  Recommendations discussed with the primary team (RACHEL-Dr Patrick Colon) on 1/6/2020    Please contact us if you have any questions  Subjective:    Review of Systems   Constitutional: Positive for fatigue  Negative for fever  HENT:   Negative for trouble swallowing  Eyes: Positive for icterus  Respiratory: Negative for cough, shortness of breath and wheezing  Cardiovascular: Negative for chest pain, leg swelling and palpitations  Gastrointestinal: Negative for abdominal pain, blood in stool, diarrhea and nausea  Musculoskeletal: Negative for back pain  Hematological: Negative for adenopathy  Psychiatric/Behavioral: Negative for confusion  All other systems reviewed and are negative  The remainder of a 12 point review of systems was negative  Objective:  /69 (BP Location: Left arm)   Pulse (!) 113   Temp 97 9 °F (36 6 °C) (Oral)   Resp 18   Ht 5' 3" (1 6 m)   Wt 59 5 kg (131 lb 1 6 oz)   LMP 01/01/2011 (Approximate)   SpO2 96%   BMI 23 22 kg/m²     Physical Exam   Constitutional: She is oriented to person, place, and time  No distress  Eyes: EOM are normal  Scleral icterus is present  Neck: Normal range of motion  Cardiovascular: Regular rhythm  Tachycardia present     Tachycardic to 112 bpm/regular Pulmonary/Chest: Effort normal and breath sounds normal  No respiratory distress  She has no wheezes  She exhibits no tenderness  Abdominal: There is no tenderness  Musculoskeletal: She exhibits no edema or tenderness  Neurological: She is alert and oriented to person, place, and time  Skin: Pallor:    Vitals reviewed  Recent Labs     01/04/20  0521 01/05/20  0947 01/06/20  0518   WBC 5 43 4 83 3 94*   HGB 13 9 13 0 12 8   * 103* 88*   MCV 92 89 90   RDW 18 9* 18 9* 18 6*   CREATININE 1 17 1 10 1 08       Laboratory studies were reviewed      Code Status: Level 1 - Full Code    Counseling / Coordination of Care  Total floor / unit time spent today 30 minutes  Greater than 50% of total time was spent with the patient and / or family counseling and / or coordination of care

## 2020-01-06 NOTE — PROGRESS NOTES
NEPHROLOGY PROGRESS NOTE   Hugh Curry 62 y o  female MRN: [id number]  Unit/Bed#: S -01 Encounter: [id number]  Reason for Consult:  Electrolyte disorders    ASSESSMENT and PLAN:    78-year-old female with a past medical history of metastatic breast cancer stage IIIA, status post mastectomy, with positive lymph nodes, status post chemotherapy, then treated with adjuvant hormonal therapy for 5 years, recent admission in December 2019 with hypercalcemia and was found to have metastatic disease to the liver, requiring intravenous infusions of fluid as an outpatient, who is now presenting with abnormal bilirubin levels   Nephrology is consulted for hyponatremia  1 ) Hyponatremia  --admission sodium 130  --initially decreased with IV fluids to 128, but showed improvement after course of IV fluids as well  --serum sodium slightly improved to 131 today  --concurrently hypokalemic, which will also contribute to the hyponatremia  --nausea seems improved today  --urine osmolality is 291, urine sodium is low at 11  --hypo-osmolar at 272  --TSH 1 256, normal  --seems to be tolerating oral intake well  --replace potassium 40 mEq p o  X1    2 ) Hypokalemia  --replace with 40 mEq of potassium p o   X1  --magnesium level from 4 days ago was normal      SUBJECTIVE / INTERVAL HISTORY:    Nausea seems little bit better today    OBJECTIVE:  Current Weight: Weight - Scale: 59 5 kg (131 lb 1 6 oz)  Vitals:    01/05/20 1528 01/05/20 2300 01/06/20 0228 01/06/20 0700   BP: 134/84 115/71 137/96 111/69   BP Location: Left arm Left arm Left arm Left arm   Pulse: (!) 110 (!) 115 (!) 112 (!) 113   Resp: 16 18  18   Temp: 98 °F (36 7 °C) 98 2 °F (36 8 °C)  97 9 °F (36 6 °C)   TempSrc: Oral Oral  Oral   SpO2: 97% 96% 94% 96%   Weight:       Height:           Intake/Output Summary (Last 24 hours) at 1/6/2020 0904  Last data filed at 1/6/2020 0700  Gross per 24 hour   Intake 580 ml   Output --   Net 580 ml       Review of Systems:    12 point ROS has been reviewed  Physical Exam   Constitutional: She is oriented to person, place, and time  She appears well-developed and well-nourished  No distress  HENT:   Head: Normocephalic and atraumatic  Eyes: Pupils are equal, round, and reactive to light  No scleral icterus  Neck: Normal range of motion  Neck supple  Cardiovascular: Normal rate, regular rhythm and normal heart sounds  Exam reveals no gallop and no friction rub  No murmur heard  Pulmonary/Chest: Effort normal and breath sounds normal  No respiratory distress  She has no wheezes  She has no rales  She exhibits no tenderness  Abdominal: Soft  Bowel sounds are normal  She exhibits no distension  There is no tenderness  There is no rebound  Musculoskeletal: Normal range of motion  She exhibits no edema  Neurological: She is alert and oriented to person, place, and time  Skin: No rash noted  She is not diaphoretic  Psychiatric: She has a normal mood and affect  Nursing note and vitals reviewed        Medications:    Current Facility-Administered Medications:     acetaminophen (TYLENOL) tablet 325 mg, 325 mg, Oral, Q8H PRN, Tommy Castellano MD, 325 mg at 01/04/20 2034    busPIRone (BUSPAR) tablet 15 mg, 15 mg, Oral, Daily, Tuan Roy MD, 15 mg at 01/03/20 2125    enoxaparin (LOVENOX) subcutaneous injection 40 mg, 40 mg, Subcutaneous, Daily, Tuan Roy MD, 40 mg at 01/05/20 0817    famotidine (PEPCID) tablet 20 mg, 20 mg, Oral, Daily PRN, RICARDA Agrawal, 20 mg at 01/05/20 2004    metoclopramide (REGLAN) tablet 10 mg, 10 mg, Oral, 4x Daily PRN, Tuan Roy MD, 10 mg at 01/05/20 1549    ondansetron (ZOFRAN) 8 mg in sodium chloride 0 9 % 50 mL IVPB, 8 mg, Intravenous, Once, Mark Barahona DO    palbociclib (IBRANCE) capsule 100 mg, 100 mg, Oral, Daily, Tommy Castellano MD, 100 mg at 01/05/20 1713    potassium chloride (K-DUR,KLOR-CON) CR tablet 40 mEq, 40 mEq, Oral, Once, Kenia Monaco, MD    zolpidem (AMBIEN) tablet 2 5 mg, 2 5 mg, Oral, HS PRN, Mark Barahona, DO, 2 5 mg at 01/05/20 5417    Laboratory Results:  Results from last 7 days   Lab Units 01/06/20  0518 01/05/20  0947 01/04/20  0521 01/03/20  1011 01/02/20  1440 01/02/20  1116   WBC Thousand/uL 3 94* 4 83 5 43 5 49 8 19 8 30   HEMOGLOBIN g/dL 12 8 13 0 13 9 13 1 15 6* 16 0*   HEMATOCRIT % 36 1 36 0 40 1 36 2 43 4 45 5   PLATELETS Thousands/uL 88* 103* 123* 138* 172 186   POTASSIUM mmol/L 3 3* 3 1* 3 4* 3 4* 2 9* 2 9*   CHLORIDE mmol/L 100 100 102 100 92* 92*   CO2 mmol/L 20* 21 16* 23 27 29   BUN mg/dL 13 13 15 14 16 15   CREATININE mg/dL 1 08 1 10 1 17 1 09 1 34* 1 55*   CALCIUM mg/dL 7 8* 7 8* 8 7 8 4 10 3* 10 3*   MAGNESIUM mg/dL  --   --   --   --  2 0  --

## 2020-01-06 NOTE — PLAN OF CARE
Problem: METABOLIC, FLUID AND ELECTROLYTES - ADULT  Goal: Electrolytes maintained within normal limits  Description  INTERVENTIONS:  - Monitor labs and assess patient for signs and symptoms of electrolyte imbalances  - Administer electrolyte replacement as ordered  - Monitor response to electrolyte replacements, including repeat lab results as appropriate  - Instruct patient on fluid and nutrition as appropriate  Outcome: Progressing  Goal: Fluid balance maintained  Description  INTERVENTIONS:  - Monitor labs   - Monitor I/O and WT  - Instruct patient on fluid and nutrition as appropriate  - Assess for signs & symptoms of volume excess or deficit  Outcome: Progressing     Problem: CARDIOVASCULAR - ADULT  Goal: Maintains optimal cardiac output and hemodynamic stability  Description  INTERVENTIONS:  - Monitor I/O, vital signs and rhythm  - Monitor for S/S and trends of decreased cardiac output  - Administer and titrate ordered vasoactive medications to optimize hemodynamic stability  - Assess quality of pulses, skin color and temperature  - Assess for signs of decreased coronary artery perfusion  - Instruct patient to report change in severity of symptoms  Outcome: Progressing  Goal: Absence of cardiac dysrhythmias or at baseline rhythm  Description  INTERVENTIONS:  - Continuous cardiac monitoring, vital signs, obtain 12 lead EKG if ordered  - Administer antiarrhythmic and heart rate control medications as ordered  - Monitor electrolytes and administer replacement therapy as ordered  Outcome: Progressing     Problem: DISCHARGE PLANNING  Goal: Discharge to home or other facility with appropriate resources  Description  INTERVENTIONS:  - Identify barriers to discharge w/patient and caregiver  - Arrange for needed discharge resources and transportation as appropriate  - Identify discharge learning needs (meds, wound care, etc )  - Arrange for interpretive services to assist at discharge as needed  - Refer to Case Management Department for coordinating discharge planning if the patient needs post-hospital services based on physician/advanced practitioner order or complex needs related to functional status, cognitive ability, or social support system  Outcome: Progressing     Problem: Potential for Falls  Goal: Patient will remain free of falls  Description  INTERVENTIONS:  - Assess patient frequently for physical needs  -  Identify cognitive and physical deficits and behaviors that affect risk of falls    -  [place] fall precautions as indicated by assessment   - Educate patient/family on patient safety including physical limitations  - Instruct patient to call for assistance with activity based on assessment  - Modify environment to reduce risk of injury  - Consider OT/PT consult to assist with strengthening/mobility  Outcome: Progressing

## 2020-01-06 NOTE — PROGRESS NOTES
Progress Note - Rickey Palomares 1955, 62 y o  female MRN: [id number]    Unit/Bed#: S -72 Encounter: [id number]    Primary Care Provider: Taras Lin DO   Date and time admitted to hospital: 1/2/2020  2:11 PM    * Abnormal LFTs (liver function tests)  Assessment & Plan  · Patient with elevated AST, ALT, alk-phos and bilirubin  · This has been increasing over last few weeks  · No evidence of significant extra biliary obstruction on CT   · Oncology contacted by ER  Recommended admitting patient for GI evaluation  · GI consult appreciated, with the following recommendations:  · MRI/MRCP: no obstruction noted  · Trend LFTs  Malignant neoplasm of right breast in female, estrogen receptor positive ([address] )  Assessment & Plan  · Follows with Dr Morgan Magallanes as outpatient   · Currently on Palbociclib daily  · Now with worsening metastatic liver disease  · Oncology consult appreciated with the following recommendations:   · Continue the current treatment regimen with palbociclib this admission (3 weeks on/1 week off regimen)  · She will continue as scheduled outpatient fluvestrant, next dose 1/9  · This was discussed directly with Bryan Rivera today, 1/3  · Recommend follow GI recommendations if there is biliary obstruction and possibly stent placement, otherwise treatment for elevated transaminases/hyperbilirubinemia will continue to be systemic anticancer therapy  · Recommend monitor serum calcium levels this admission due to the patient's history of hypercalcemia  She has had bisphosphonate approximately 1 month ago  Tachycardia  Assessment & Plan  · Patient noted to have elevated HR in 100s and above since admission  · Patient denies any clinical symptoms  ie SOB, chest pain, palpitations  · Given patient's underlying hx of metastatic cancer  · CTA PE study to r/u PE    Liver metastasis (HCC)  Assessment & Plan  · Seen on the CT abdomen  · MRI does not show any obstruction     · Will continue to follow LFTs  Hypokalemia  Assessment & Plan  · K: 3 3  · Currently Resolved   · Replace with 40 m q of potassium po x1    Hyponatremia  Assessment & Plan  · Admission sodium 130  · Nephrology input noted         VTE Pharmacologic Prophylaxis:   Pharmacologic: Enoxaparin (Lovenox)  Mechanical VTE Prophylaxis in Place: Yes    Discussions with Specialists or Other Care Team Provider: Discussed case with my attending  Education and Discussions with Family / Patient: I discussed the plan with the patient and family at bedside, all questions were answered  Patient was told to reach out if any further questions arise  Current Length of Stay: 3 day(s)    Current Patient Status: Inpatient     Discharge Plan / Estimated Discharge Date: To be determined <24 hours in CTA PE study negative  Code Status: Level 1 - Full Code      Subjective:   Patient offers no complaints  No significant overnight events reported  Pertinent negatives include: Chest pain, palpitations, leg edema, dyspnea, cough, hemoptysis, nausea, vomiting, diarrhea, abdominal pain  Objective:     Vitals:   Temp (24hrs), Av °F (36 7 °C), Min:97 9 °F (36 6 °C), Max:98 2 °F (36 8 °C)    Temp:  [97 9 °F (36 6 °C)-98 2 °F (36 8 °C)] 97 9 °F (36 6 °C)  HR:  [110-115] 113  Resp:  [16-18] 18  BP: (111-137)/(69-96) 111/69  SpO2:  [94 %-97 %] 96 %  Body mass index is 23 22 kg/m²  Input and Output Summary (last 24 hours): Intake/Output Summary (Last 24 hours) at 2020 1218  Last data filed at 2020 1100  Gross per 24 hour   Intake 880 ml   Output --   Net 880 ml       Physical Exam:     Physical Exam   Constitutional: She appears well-developed and well-nourished  HENT:   Head: Atraumatic  Eyes: Pupils are equal, round, and reactive to light  EOM are normal    Neck: Normal range of motion  Neck supple  Cardiovascular: Normal rate, regular rhythm, normal heart sounds and intact distal pulses   Exam reveals no gallop and no friction rub  No murmur heard  Pulmonary/Chest: Effort normal and breath sounds normal    Abdominal: Soft  Bowel sounds are normal  She exhibits no distension  There is no tenderness  Musculoskeletal: Normal range of motion  She exhibits no edema  Skin: Skin is warm and dry  No pallor  Nursing note and vitals reviewed  Additional Data:     Labs:    Results from last 7 days   Lab Units 01/06/20  0518 01/05/20  0947  01/03/20  1011   WBC Thousand/uL 3 94* 4 83   < > 5 49   HEMOGLOBIN g/dL 12 8 13 0   < > 13 1   HEMATOCRIT % 36 1 36 0   < > 36 2   PLATELETS Thousands/uL 88* 103*   < > 138*   NEUTROS PCT %  --   --   --  82*   LYMPHS PCT %  --   --   --  16   LYMPHO PCT %  --  7*  --   --    MONOS PCT %  --   --   --  2*   MONO PCT %  --  2*  --   --    EOS PCT %  --  0  --  0    < > = values in this interval not displayed  Results from last 7 days   Lab Units 01/06/20  0518  01/03/20  1011   POTASSIUM mmol/L 3 3*   < > 3 4*   CHLORIDE mmol/L 100   < > 100   CO2 mmol/L 20*   < > 23   BUN mg/dL 13   < > 14   CREATININE mg/dL 1 08   < > 1 09   CALCIUM mg/dL 7 8*   < > 8 4   ALK PHOS U/L  --   --  251*   ALT U/L  --   --  68   AST U/L  --   --  192*    < > = values in this interval not displayed  Results from last 7 days   Lab Units 01/02/20  1440   INR  1 47*       * I Have Reviewed All Lab Data Listed Above  * Additional Pertinent Lab Tests Reviewed:  All Labs Within Last 24 Hours Reviewed    Imaging:    Imaging Reports Reviewed Today Include: pending CTA PE study   Imaging Personally Reviewed by Myself Includes:  None     Recent Cultures (last 7 days):       Last 24 Hours Medication List:     Current Facility-Administered Medications:  acetaminophen 325 mg Oral Q8H PRN Rivera Gooden MD   busPIRone 15 mg Oral Daily Carrie Anglin MD   enoxaparin 40 mg Subcutaneous Daily Carrie Anglin MD   famotidine 20 mg Oral Daily PRN RICARDA Schmitt   metoclopramide 10 mg Oral 4x Daily PRN Kumar Rodriguez MD   ondansetron 8 mg Intravenous Once Mark Barahona DO   palbociclib 100 mg Oral Daily Ruby Wise MD   zolpidem 2 5 mg Oral HS PRN Josue Batista DO        Today, Patient Was Seen By: Estefanía Barajas MD    ** Please Note: This note has been constructed using a voice recognition system   **

## 2020-01-06 NOTE — ASSESSMENT & PLAN NOTE
· Patient noted to have elevated HR in 100s and above since admission  · Patient denies any clinical symptoms  ie SOB, chest pain, palpitations  · Given patient's underlying hx of metastatic cancer  · CTA PE study to r/u PE

## 2020-01-06 NOTE — ASSESSMENT & PLAN NOTE
· Follows with Dr Jose Alejandro Marshall as outpatient   · Currently on Palbociclib daily  · Now with worsening metastatic liver disease  · Oncology consult appreciated with the following recommendations:   · Continue the current treatment regimen with palbociclib this admission (3 weeks on/1 week off regimen)  · She will continue as scheduled outpatient fluvestrant, next dose 1/9  · This was discussed directly with Devan Pearl today, 1/3  · Recommend follow GI recommendations if there is biliary obstruction and possibly stent placement, otherwise treatment for elevated transaminases/hyperbilirubinemia will continue to be systemic anticancer therapy  · Recommend monitor serum calcium levels this admission due to the patient's history of hypercalcemia  She has had bisphosphonate approximately 1 month ago

## 2020-01-06 NOTE — CONSULTS
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   Previous CT scan done in April 2017 although it was with poor timing of the contrast, was difficult to compare  2  No evidence of PE  This is most likely previous radiation injury to the pulmonary artery branch  3  Cough, also could be related to injury to the right laryngeal nerve, Ibrance also can cause cough  Plan:   1  No need for therapeutic anticoagulation  2  Add cough suppressant  3  Continue her chemo regimen per Oncology  4  The patient remains at risk for future VTE  However, she does not have PE on this admission  History of Present Illness   Physician Requesting Consult: Dipika Hinojosa MD  Reason for Consult / Principal Problem:  Abnormal CT     HPI: Danette Dale is a 62y o  year old female who presents with abnormal labs, was sent to the hospital due to hypokalemia and hypernatremia  The patient has been battling breast CA for the past decade, underwent radiation and chemotherapy in the past, and currently she is on [facility]   She denies any shortness of breath, nonproductive cough has been persistent  But she denies any sputum production, no hemoptysis, she denies any chest pain, no palpitation, no dizziness, occasional heartburn, but she does not have any nausea or vomiting, she has increased and abdominal girth, but she is aware of having intra-abdominal fluid but she never been tapped according to her  She denies any leg pain, she is ambulatory without any difficulty breathing  No orthopnea no nocturnal symptoms  The rest of her review of system including 10 systems otherwise were unremarkable  Negative      Consults    Review of Systems    Historical Information   Past Medical History:   Diagnosis Date    Breast CA ([facility] [address]     Elevated serum creatinine 1/2/2020    GERD (gastroesophageal reflux disease)     Hyperlipidemia     Hypokalemia 1/2/2020     Past Surgical History:   Procedure Laterality Date    BREAST BIOPSY      MASTECTOMY Bilateral     RECONSTRUCTION BREAST W/ TRAM FLAP Right      Social History   Social History     Substance and Sexual Activity   Alcohol Use Not Currently    Comment: social     Social History     Substance and Sexual Activity   Drug Use No     Social History     Tobacco Use   Smoking Status Never Smoker   Smokeless Tobacco Never Used     Occupational History:  Nonsmoker lifetime  Family History: non-contributory    Meds/Allergies   all current active meds have been reviewed    Allergies   Allergen Reactions    Percocet [Oxycodone-Acetaminophen]     Vancomycin        Objective   Vitals: Blood pressure 111/69, pulse (!) 113, temperature 97 9 °F (36 6 °C), temperature source Oral, resp  rate 18, height 5' 3" (1 6 m), weight 59 5 kg (131 lb 1 6 oz), last menstrual period 01/01/2011, SpO2 96 %  ,Body mass index is 23 22 kg/m²  Intake/Output Summary (Last 24 hours) at 1/6/2020 1553  Last data filed at 1/6/2020 1100  Gross per 24 hour   Intake 880 ml   Output --   Net 880 ml     Invasive Devices     Peripheral Intravenous Line            Peripheral IV 01/05/20 Left Arm 1 day                Physical Exam vital signs are stable, O2 saturation 95% on room air, trachea is midline, S1-S2, regular rate and rhythm, tachycardic, slight murmur were audible, no rub or gallop, distant breath sounds bilaterally, no wheezing crackles or rhonchi, abdomen is distended but soft, no rebound, nontender, fluid shift was notable  No edema in the periphery, no pain in her calves, neurologically she is nonfocal, cranial nerves are intact, normal gait  Lab Results: I have personally reviewed pertinent lab results  Imaging Studies: I have personally reviewed pertinent reports      Reviewed personally, which showed absence of the superior branch of the right pulmonary artery, compatible with her previous radiation therapy to the right upper lower lobe region  EKG, Pathology, and Other Studies: I have personally reviewed pertinent reports      VTE Prophylaxis: Heparin    Code Status: Level 1 - Full Code  Advance Directive and Living Will:      Power of :    POLST:      None

## 2020-01-06 NOTE — ASSESSMENT & PLAN NOTE
· Patient with elevated AST, ALT, alk-phos and bilirubin  · This has been increasing over last few weeks  · No evidence of significant extra biliary obstruction on CT   · Oncology contacted by ER  Recommended admitting patient for GI evaluation  · GI consult appreciated, with the following recommendations:  · MRI/MRCP: no obstruction noted  · Trend LFTs

## 2020-01-06 NOTE — SOCIAL WORK
During SLIM rounds, setup of VNA was inquiring for pt to have at discharge  VNA for SN and home PT  Met with pt and her  and discussed VNA  Freedom of choice discussed  Pt and spouse did not have a preference but was interested in [facility]  Referral placed  Will follow-up

## 2020-01-07 PROBLEM — E87.6 HYPOKALEMIA: Status: RESOLVED | Noted: 2020-01-01 | Resolved: 2020-01-01

## 2020-01-07 NOTE — UTILIZATION REVIEW
Continued Stay Review   [id number]  [facility] Spearing is 5 day DRG facility  Attending - [facility] internal medicine - Dr Amy Castaneda    Date: 1/7/2020                   Current Patient Class: inpatient  Current Level of Care: med surg    HPI:64 y o  female initially admitted on 1/2/2020 to observation and converted to inpatient due to abnormal LFTs/hyperbilirubemia   Has breast cancer with liver metastasis  MRI/MRCP showed no obstruction  Assessment/Plan:  Patient with persistent tachycardia  CTA PE study, was negative PE, refuses to take Buspar and possibly tachycardia could be from with drawal of medicine  Patient is hyponatremic, is on fluid restriction  And sodium bicarb tabs  Has persistent nausea and reglan in progress  Per nephrology 1/7 - hyponatremia -  initially decreased with IV fluids to 128, but showed improvement after course of IV fluids , today is decreased with worsening nausea  Continue fluid restriction,  For low bicarb, sodium bicarb tabs started  Pertinent Labs/Diagnostic Results: 1/2/2020- CT abdomen - Innumerable hepatic lesions have developed in the interval consistent with metastatic disease  Moderate abdominal pelvic ascites     1/3/2020- MRI abdomen/MRCP-No intrahepatic or extrahepatic bile ductal dilation   Normal CBD diameter  Numerous hepatic metastases are again seen  Multiple osseous metastases of the spine   Lower sternal metastasis   No pathologic fracture  Enlarged left para-aortic node, likely metastatic  Moderate volume of abdominal ascites  1/6/2020 CTA chest - Absent enhancement in segment on subsegmental apical right upper lobe pulmonary arterial branches consistent with age indeterminate but still possibly acute right upper lobe pulmonary embolus  No other pulmonary emboli noted  Hypoventilatory changes  No pulmonary mass or consolidative airspace opacity in the lungs  Extensive hepatic metastatic disease    Osseous metastatic disease      Results from last 7 days   Lab Units 01/07/20  0505 01/06/20  0518 01/05/20  0947 01/04/20  0521 01/03/20  1011 01/02/20  1440 01/02/20  1116   WBC Thousand/uL 3 52* 3 94* 4 83 5 43 5 49 8 19 8 30   HEMOGLOBIN g/dL 12 8 12 8 13 0 13 9 13 1 15 6* 16 0*   HEMATOCRIT % 35 9 36 1 36 0 40 1 36 2 43 4 45 5   PLATELETS Thousands/uL 85* 88* 103* 123* 138* 172 186   NEUTROS ABS Thousands/µL  --   --   --   --  4 49 6 70 6 14     Results from last 7 days   Lab Units 01/07/20  0505 01/06/20  0518 01/05/20  0947 01/04/20  0521 01/03/20  1011 01/02/20  1440   SODIUM mmol/L 129* 131* 130* 128* 130* 128*   POTASSIUM mmol/L 3 8 3 3* 3 1* 3 4* 3 4* 2 9*   CHLORIDE mmol/L 98* 100 100 102 100 92*   CO2 mmol/L 21 20* 21 16* 23 27   ANION GAP mmol/L 10 11 9 10 7 9   BUN mg/dL 13 13 13 15 14 16   CREATININE mg/dL 1 08 1 08 1 10 1 17 1 09 1 34*   EGFR ml/min/1 73sq m 55 55 54 50 54 42   CALCIUM mg/dL 7 8* 7 8* 7 8* 8 7 8 4 10 3*   MAGNESIUM mg/dL  --   --   --   --   --  2 0     Results from last 7 days   Lab Units 01/03/20  1011 01/02/20  1440 01/02/20  1116   AST U/L 192* 273* 263*   ALT U/L 68 100* 98*   ALK PHOS U/L 251* 323* 322*   TOTAL PROTEIN g/dL 5 2* 6 2* 6 1*   ALBUMIN g/dL 1 8* 2 2* 2 1*   TOTAL BILIRUBIN mg/dL 6 22* 6 54* 6 00*         Results from last 7 days   Lab Units 01/07/20  0505 01/06/20  0518 01/05/20  0947 01/04/20  0521 01/03/20  1011 01/02/20  1440 01/02/20  1116   GLUCOSE RANDOM mg/dL 83 72 102 79 117 89 90     Results from last 7 days   Lab Units 01/05/20  0947   OSMOLALITY, SERUM mmol/*     Results from last 7 days   Lab Units 01/02/20  1440   PROTIME seconds 17 1*   INR  1 47*   PTT seconds 36     Results from last 7 days   Lab Units 01/05/20  0947   TSH 3RD GENERATON uIU/mL 1 256     Results from last 7 days   Lab Units 01/02/20  1440   LIPASE u/L 161     Results from last 7 days   Lab Units 01/05/20  0816   SODIUM UR  11     Results from last 7 days   Lab Units 01/05/20  0947   TOTAL COUNTED  100       Vital Signs:   01/07/20 1455  97 6 °F (36 4 °C)  128Abnormal   20  126/81  --  97 %  None (Room air)  Lying   01/07/20 0700  97 6 °F (36 4 °C)  111Abnormal   18  117/69  --  96 %  None (Room air)         Medications:   Scheduled Medications:  Medications:  busPIRone 15 mg Oral Daily   [START ON 1/8/2020] enoxaparin 40 mg Subcutaneous Q24H [facility]   palbociclib 100 mg Oral Daily   sodium bicarbonate 650 mg Oral BID after meals     Continuous IV Infusions: none     PRN Meds:  acetaminophen 325 mg Oral Q8H PRN   famotidine 20 mg Oral Daily PRN   Metoclopramide - used x 2 10 mg Oral 4x Daily PRN   zolpidem 2 5 mg Oral HS PRN       Discharge Plan: To be determined  CM has made referral for SN and home PT to [facility] Utilization Review Department  [email or web address]  ATTENTION: Please call with any questions or concerns to [phone] and carefully listen to the prompts so that you are directed to the right person  All voicemails are confidential   Mat Bicker all requests for admission clinical reviews, approved or denied determinations and any other requests to dedicated fax number below belonging to the campus where the patient is receiving treatment   List of dedicated fax numbers for the Facilities:  [address] DENIALS (Administrative/Medical Necessity) [phone]   [address] (Maternity/NICU/Pediatrics) [phone]   Isaias Orantes [phone]   [facility] [phone]   [facility] [phone]   Navjot Ortiz [phone]   12[address] [facility] [address] [facility] [phone]   [facility]  [phone]   [address] [facility]  [address] [facility] [address] [facility] [phone]

## 2020-01-07 NOTE — ASSESSMENT & PLAN NOTE
· Follows with Dr Morgan Magallanes as outpatient   · Currently on Palbociclib daily  · Now with worsening metastatic liver disease  · Oncology consult appreciated with the following recommendations:   · Continue the current treatment regimen with palbociclib this admission (3 weeks on/1 week off regimen)  · She will continue as scheduled outpatient fluvestrant, next dose 1/9  · This was discussed directly with Bryan Rivera today, 1/3  · Recommend follow GI recommendations if there is biliary obstruction and possibly stent placement, otherwise treatment for elevated transaminases/hyperbilirubinemia will continue to be systemic anticancer therapy  · Recommend monitor serum calcium levels this admission due to the patient's history of hypercalcemia  She has had bisphosphonate approximately 1 month ago

## 2020-01-07 NOTE — SOCIAL WORK
[facility] unable to accept as not in par with pt's insurance  Pt informed and agreeable to blanket referral for VNA to determine which VNA can accept with pt's insurance  Blankets referrals placed and pt is accepted with [facility]  Pt and her spouse informed

## 2020-01-07 NOTE — ASSESSMENT & PLAN NOTE
· Admission sodium 130  · Nephrology input noted  · Patient started on 1800 fluid restriction  · Patient started on Sodium bicarbonate tablet 650mg BID

## 2020-01-07 NOTE — QUICK NOTE
Pulmonology evaluation patient does not have any evidence of pulmonary embolism and therefore we are in agreement to not initiate therapeutic anticoagulation for pulmonary embolism  The patient remains at high risk for developing the above and therefore she does understand the risk and understands symptoms to be aware of which would lead to a suspicion of pulmonary embolism or DVT      Patient is OK  from an oncologic perspective for discharge when medically stable by the primary team

## 2020-01-07 NOTE — PROGRESS NOTES
Progress Note - Manny Arita 1955, 62 y o  female MRN: [id number]    Unit/Bed#: S -02 Encounter: [id number]    Primary Care Provider: Shannan Robbins DO   Date and time admitted to hospital: 1/2/2020  2:11 PM    * Abnormal LFTs (liver function tests)  Assessment & Plan  · Patient with elevated AST, ALT, alk-phos and bilirubin  · This has been increasing over last few weeks  · No evidence of significant extra biliary obstruction on CT   · Oncology contacted by ER  Recommended admitting patient for GI evaluation  · GI consult appreciated, with the following recommendations:  · MRI/MRCP: no obstruction noted  · Trend LFTs  Malignant neoplasm of right breast in female, estrogen receptor positive ([facility] [address] )  Assessment & Plan  · Follows with Dr Lorne Singh as outpatient   · Currently on Palbociclib daily  · Now with worsening metastatic liver disease  · Oncology consult appreciated with the following recommendations:   · Continue the current treatment regimen with palbociclib this admission (3 weeks on/1 week off regimen)  · She will continue as scheduled outpatient fluvestrant, next dose 1/9  · This was discussed directly with Radha Milian today, 1/3  · Recommend follow GI recommendations if there is biliary obstruction and possibly stent placement, otherwise treatment for elevated transaminases/hyperbilirubinemia will continue to be systemic anticancer therapy  · Recommend monitor serum calcium levels this admission due to the patient's history of hypercalcemia  She has had bisphosphonate approximately 1 month ago       Tachycardia  Assessment & Plan  · Patient noted to have elevated HR in 100s and above since admission  · Patient denies any clinical symptoms  ie SOB, chest pain, palpitations  · Given patient's underlying hx of metastatic cancer  · CTA PE study to r/u PE: negative for PE   · Patient admits to refusing to take her Buspar, possibly symptoms of Tachycardia are associated with withdrawal of medication     Liver metastasis ([facility] [address] )  Assessment & Plan  · Seen on the CT abdomen  · MRI does not show any obstruction  · Will continue to follow LFTs  Hyponatremia  Assessment & Plan  · Admission sodium 130  · Nephrology input noted  · Patient started on 1800 fluid restriction  · Patient started on Sodium bicarbonate tablet 650mg BID      Hypokalemia-resolved as of 2020  Assessment & Plan  · K: 3 8 this morning   · Currently Resolved   · Replace with 40 m q of potassium po x1          VTE Pharmacologic Prophylaxis:   Pharmacologic: Enoxaparin (Lovenox)  Mechanical VTE Prophylaxis in Place: Yes    Discussions with Specialists or Other Care Team Provider: Discussed case with my attending and nephrology  Oncology, pulmonology on board     Education and Discussions with Family / Patient: I discussed the plan with the patient and family at bedside, all questions were answered  Patient was told to reach out if any further questions arise  Current Length of Stay: 4 day(s)    Current Patient Status: Inpatient     Discharge Plan / Estimated Discharge Date: To be determined, possibly <24 hours     Code Status: Level 1 - Full Code      Subjective:   Patient offers no complaints  No significant overnight events reported  Pertinent negatives include: Chest pain, palpitations, leg edema, dyspnea, cough, hemoptysis, nausea, vomiting, diarrhea, abdominal pain  Objective:     Vitals:   Temp (24hrs), Av 7 °F (36 5 °C), Min:97 6 °F (36 4 °C), Max:97 8 °F (36 6 °C)    Temp:  [97 6 °F (36 4 °C)-97 8 °F (36 6 °C)] 97 6 °F (36 4 °C)  HR:  [111-128] 128  Resp:  [18-20] 20  BP: (112-126)/(69-81) 126/81  SpO2:  [95 %-97 %] 97 %  Body mass index is 23 22 kg/m²  Input and Output Summary (last 24 hours):        Intake/Output Summary (Last 24 hours) at 2020 1540  Last data filed at 2020 0900  Gross per 24 hour   Intake 360 ml   Output --   Net 360 ml       Physical Exam:   Physical Exam Constitutional: She appears well-developed and well-nourished  HENT:   Head: Atraumatic  Eyes: Pupils are equal, round, and reactive to light  EOM are normal    Neck: Normal range of motion  Neck supple  Cardiovascular: Normal rate, regular rhythm, normal heart sounds and intact distal pulses  Exam reveals no gallop and no friction rub  No murmur heard  Pulmonary/Chest: Effort normal and breath sounds normal    Abdominal: Soft  Bowel sounds are normal  She exhibits no distension  There is no tenderness  Musculoskeletal: Normal range of motion  She exhibits no edema  Skin: Skin is warm and dry  No pallor  Nursing note and vitals reviewed  Additional Data:     Labs:    Results from last 7 days   Lab Units 01/07/20  0505  01/05/20  0947  01/03/20  1011   WBC Thousand/uL 3 52*   < > 4 83   < > 5 49   HEMOGLOBIN g/dL 12 8   < > 13 0   < > 13 1   HEMATOCRIT % 35 9   < > 36 0   < > 36 2   PLATELETS Thousands/uL 85*   < > 103*   < > 138*   NEUTROS PCT %  --   --   --   --  82*   LYMPHS PCT %  --   --   --   --  16   LYMPHO PCT %  --   --  7*  --   --    MONOS PCT %  --   --   --   --  2*   MONO PCT %  --   --  2*  --   --    EOS PCT %  --   --  0  --  0    < > = values in this interval not displayed  Results from last 7 days   Lab Units 01/07/20  0505  01/03/20  1011   POTASSIUM mmol/L 3 8   < > 3 4*   CHLORIDE mmol/L 98*   < > 100   CO2 mmol/L 21   < > 23   BUN mg/dL 13   < > 14   CREATININE mg/dL 1 08   < > 1 09   CALCIUM mg/dL 7 8*   < > 8 4   ALK PHOS U/L  --   --  251*   ALT U/L  --   --  68   AST U/L  --   --  192*    < > = values in this interval not displayed  Results from last 7 days   Lab Units 01/02/20  1440   INR  1 47*       * I Have Reviewed All Lab Data Listed Above  * Additional Pertinent Lab Tests Reviewed:  All Labs Within Last 24 Hours Reviewed    Imaging:    Imaging Reports Reviewed Today Include: CTA PE study   Imaging Personally Reviewed by Myself Includes:  none    Recent Cultures (last 7 days):         Last 24 Hours Medication List:     Current Facility-Administered Medications:  acetaminophen 325 mg Oral Q8H PRN Nic Dao MD   busPIRone 15 mg Oral Daily Kitty Smith MD   [START ON 1/8/2020] enoxaparin 40 mg Subcutaneous Q24H [address] [facility] [address], [place]   famotidine 20 mg Oral Daily PRN RICARDA Raman   metoclopramide 10 mg Oral 4x Daily PRN Kitty Smith MD   palbociclib 100 mg Oral Daily Nic Dao MD   sodium bicarbonate 650 mg Oral BID after meals Tete Wynn MD   zolpidem 2 5 mg Oral HS PRN Ifeanyi Durand DO        Today, Patient Was Seen By: Srinath Harris MD    ** Please Note: This note has been constructed using a voice recognition system   **

## 2020-01-07 NOTE — PROGRESS NOTES
NEPHROLOGY PROGRESS NOTE   Tammi Bocanegra 62 y o  female MRN: [id number]  Unit/Bed#: S -01 Encounter: [id number]  Reason for Consult:  Electrolyte disorder    ASSESSMENT and PLAN:    79-year-old female with a past medical history of metastatic breast cancer stage IIIA, status post mastectomy, with positive lymph nodes, status post chemotherapy, then treated with adjuvant hormonal therapy for 5 years, recent admission in December 2019 with hypercalcemia and was found to have metastatic disease to the liver, requiring intravenous infusions of fluid as an outpatient, who is now presenting with abnormal bilirubin levels   Nephrology is consulted for hyponatremia      1 ) Hyponatremia  --admission sodium 130  --initially decreased with IV fluids to 128, but showed improvement after course of IV fluids as well  --serum sodium slightly down today to 129  --concurrently hypokalemic, which will also contribute to the hyponatremia, potassium is better today  --nausea worse today  --urine osmolality is 291, urine sodium is low at 11  --hypo-osmolar at 272  --TSH 1 256, normal  --continue fluid restriction will start sodium bicarbonate tablets  --control nausea     2 ) Hypokalemia  --resolved    3 ) Low bicarbonate level  --start sodium bicarbonate 650 mg twice a day      SUBJECTIVE / INTERVAL HISTORY:    Feeling more nauseous this morning      OBJECTIVE:  Current Weight: Weight - Scale: 59 5 kg (131 lb 1 6 oz)  Vitals:    01/06/20 0700 01/06/20 1500 01/06/20 2217 01/07/20 0700   BP: 111/69 126/82 112/70 117/69   BP Location: Left arm Left arm Left arm Left arm   Pulse: (!) 113 (!) 120 (!) 121 (!) 111   Resp: 18 18 18 18   Temp: 97 9 °F (36 6 °C) 97 6 °F (36 4 °C) 97 8 °F (36 6 °C) 97 6 °F (36 4 °C)   TempSrc: Oral Oral Oral Oral   SpO2: 96% 97% 95% 96%   Weight:       Height:           Intake/Output Summary (Last 24 hours) at 1/7/2020 1023  Last data filed at 1/7/2020 0900  Gross per 24 hour   Intake 600 ml   Output -- Net 600 ml       Review of Systems:    12 point ROS has been reviewed  Physical Exam   Constitutional: She is oriented to person, place, and time  She appears well-developed and well-nourished  No distress  HENT:   Head: Normocephalic and atraumatic  Eyes: Pupils are equal, round, and reactive to light  No scleral icterus  Neck: Normal range of motion  Neck supple  Cardiovascular: Normal rate, regular rhythm and normal heart sounds  Exam reveals no gallop and no friction rub  No murmur heard  Pulmonary/Chest: Effort normal and breath sounds normal  No respiratory distress  She has no wheezes  She has no rales  She exhibits no tenderness  Abdominal: Soft  Bowel sounds are normal  She exhibits no distension  There is no tenderness  There is no rebound  Musculoskeletal: Normal range of motion  She exhibits no edema  Neurological: She is alert and oriented to person, place, and time  Skin: No rash noted  She is not diaphoretic  Psychiatric: She has a normal mood and affect  Nursing note and vitals reviewed        Medications:    Current Facility-Administered Medications:     acetaminophen (TYLENOL) tablet 325 mg, 325 mg, Oral, Q8H PRN, Yumiko Ley MD, 325 mg at 01/04/20 2034    busPIRone (BUSPAR) tablet 15 mg, 15 mg, Oral, Daily, Beverly Real MD, 15 mg at 01/03/20 2125    enoxaparin (LOVENOX) subcutaneous injection 60 mg, 1 mg/kg, Subcutaneous, Q12H, Rajinder Sutherland MD, 60 mg at 01/07/20 2724    famotidine (PEPCID) tablet 20 mg, 20 mg, Oral, Daily PRN, RICARDA Suarez, 20 mg at 01/05/20 2004    metoclopramide (REGLAN) tablet 10 mg, 10 mg, Oral, 4x Daily PRN, Beverly Rael MD, 10 mg at 01/07/20 6532    palbociclib (IBRANCE) capsule 100 mg, 100 mg, Oral, Daily, Yumiko Ley MD, 100 mg at 01/06/20 1613    zolpidem (AMBIEN) tablet 2 5 mg, 2 5 mg, Oral, HS PRN, Mark Barahona DO, 2 5 mg at 01/05/20 2137    Laboratory Results:  Results from last 7 days   Lab Units 01/07/20  0505 01/06/20  0518 01/05/20  0947 01/04/20  0521 01/03/20  1011 01/02/20  1440 01/02/20  1116   WBC Thousand/uL 3 52* 3 94* 4 83 5 43 5 49 8 19 8 30   HEMOGLOBIN g/dL 12 8 12 8 13 0 13 9 13 1 15 6* 16 0*   HEMATOCRIT % 35 9 36 1 36 0 40 1 36 2 43 4 45 5   PLATELETS Thousands/uL 85* 88* 103* 123* 138* 172 186   POTASSIUM mmol/L 3 8 3 3* 3 1* 3 4* 3 4* 2 9* 2 9*   CHLORIDE mmol/L 98* 100 100 102 100 92* 92*   CO2 mmol/L 21 20* 21 16* 23 27 29   BUN mg/dL 13 13 13 15 14 16 15   CREATININE mg/dL 1 08 1 08 1 10 1 17 1 09 1 34* 1 55*   CALCIUM mg/dL 7 8* 7 8* 7 8* 8 7 8 4 10 3* 10 3*   MAGNESIUM mg/dL  --   --   --   --   --  2 0  --

## 2020-01-07 NOTE — ASSESSMENT & PLAN NOTE
· Patient noted to have elevated HR in 100s and above since admission  · Patient denies any clinical symptoms  ie SOB, chest pain, palpitations  · Given patient's underlying hx of metastatic cancer  · CTA PE study to r/u PE: negative for PE   · Patient admits to refusing to take her Buspar, possibly symptoms of Tachycardia are associated with withdrawal of medication

## 2020-01-07 NOTE — UTILIZATION REVIEW
Notification of Inpatient Admission/Inpatient Authorization Request   This is a Notification of Inpatient Admission for [address]  Be advised that this patient was admitted to our facility under Inpatient Status  Contact Darren Cagle at [phone] for additional admission information  [facility]ila [facility] DEPT  DEDICATED [phone]  Patient Name:   Tammi Bocanegra   YOB: 1955       Authorization Information   [address]:   [address]  Tax ID: [id number]  NPI: [id number] Attending Provider/NPI: Helga Blue Md [[id number]]   Place of Service Code: 24     Place of Service Name:  [address] [place] [address]   Start Date: 1/3/20 1155     Discharge Date & Time: No discharge date for patient encounter  Type of Admission: Inpatient Status Discharge Disposition   (if discharged): Final discharge disposition not confirmed   Patient Diagnoses: Hyponatremia [E87 1]  Jaundice [R17]  Elevated liver enzymes [R74 8]  Abnormal laboratory test result [R89 9]  Metastatic disease ([facility] ) [C79 9]  Liver metastasis ([facility] ) [C78 7]  Malignant neoplasm of right breast in female, estrogen receptor positive, unspecified site of breast ([facility] ) [C50 911, Z17 0]     Orders: Admission Orders (From admission, onward)     Ordered        01/03/20 1155  Inpatient Admission  Once         01/02/20 1737  Place in Observation  Once                    Assigned Utilization Review Contact: Darren Cagle  Utilization   Network Utilization Review Department  Phone: [phone]; Fax [phone]  Email: [email or web address]   ATTENTION PAYERS: Please call the assigned Utilization  directly with any questions or concerns ALL voicemails in the department are confidential  Send all requests for admission clinical reviews, approved or denied determinations and any other requests to dedicated fax number belonging to the campus where the patient is receiving treatment  Ania Landry RN   Registered Nurse   Utilization Review   Utilization Review   Addendum   Date of Service:  1/3/2020  7:41 AM               Expand All Collapse All      Show:Clear all  [x]Manual[x]Template[x]Copied    Added by:  Stella Kramer RN    []Elenita for details  Initial Clinical Review     Admission: Date/Time/Statement:  1/2/2020 1737 Observation and changed 1/3/2020 1155 inpatient re:  Patient will need > 2 midnight stay for persistently elevated bilirubin,  Increased hepatomegaly with increased hepatic lesions on CT, necessitating MRCP  Start     Ordered   01/03/20 1154  Inpatient Admission Once     Transfer Service: General Medicine       Question Answer Comment   Admitting Physician Juli Nance     Level of Care Med Surg     Estimated length of stay More than 2 Midnights     Certification I certify that inpatient services are medically necessary for this patient for a duration of greater than two midnights  See H&P and MD Progress Notes for additional information about the patient's course of treatment          01/03/20 1155                   ED Arrival Information      Expected Arrival Acuity Means of Arrival Escorted By Service Admission Type     - 1/2/2020 13:52 Emergent Walk-In Spouse Hospitalist Emergency     Arrival Complaint     Abnormal Labs               Chief Complaint   Patient presents with    Abnormal Lab       Pt reports being sent over by oncologist for evaluation of abnormal blood work  Pt being treated for liver cancer  Dx December 2nd, 2019       Assessment/Plan:  this is a 62year old female from home to ED from oncology admitted as observation due to abnormal LFTs/electrolyte imbalance in setting of poor oral intake and worsening metastatic liver disease  Has active breast cancer with liver metastasis undergoing chemo  Patient presented due to elevated bilirubin of 6    Last evening with nausea and vomiting, has fatigue with increased shortness of breath  On exam has scleral icterus, tachycardic  Na 128, K 2 9 hemolyzed  Bun 16  Creatinine 1 34  Calcium 10 3      Alkaline phosphatase 323  Total bilirubin 6 54  Albumin 2 2  INR 1 47  CT abdomen showed hepatic lesions consistent with metastatic disease, ascites   GI consulted  Trend LFTs, replete K, telemetry and IVF in progress       On 1/3/2020 changed to inpatient:  Has persistent elevation in bilirubin with worsening liver metastasis  For MRCP  Has poor intake, K 3 4 and will replete  Continue IVF     1/3/2020 per GI -  Has elevated LFTs in setting of liver metastasis from breast cancer, Initially, no note of biliary dilatation to suggest obstruction  Her bilirubin went from 1 7 to 6 5 within a 2 week period  innumerable liver masses on CT  For MRCP without contrast due to ELLIOT              ED Triage Vitals [01/02/20 1407]   Temperature Pulse Respirations Blood Pressure SpO2   98 1 °F (36 7 °C) (!) 125 18 143/82 98 %       Temp Source Heart Rate Source Patient Position - Orthostatic VS BP Location FiO2 (%)   Oral Monitor Sitting Right arm --       Pain Score           No Pain                Wt Readings from Last 1 Encounters:   01/02/20 59 5 kg (131 lb 1 6 oz)      Additional Vital Signs:   01/03/20 0735   98 3 °F (36 8 °C)   97   16   130/67   --   96 %   None (Room air)   Lying   01/02/20 2207   97 9 °F (36 6 °C)   107Abnormal    18   129/74   95   94 %   None (Room air)   Lying   01/02/20 1937   97 6 °F (36 4 °C)   110Abnormal    18   144/83   105   98 %   None (Room air)             Pertinent Labs/Diagnostic Test Results:   1/2/2020- CT abdomen - Innumerable hepatic lesions have developed in the interval consistent with metastatic disease  Moderate abdominal pelvic ascites     1/3/2020- MRI abdomen/MRCP-No intrahepatic or extrahepatic bile ductal dilation   Normal CBD diameter  Numerous hepatic metastases are again seen    Multiple osseous metastases of the spine   Lower sternal metastasis   No pathologic fracture  Enlarged left para-aortic node, likely metastatic    Moderate volume of abdominal ascites            Results from last 7 days   Lab Units 01/02/20  1440 01/02/20  1116   WBC Thousand/uL 8 19 8 30   HEMOGLOBIN g/dL 15 6* 16 0*   HEMATOCRIT % 43 4 45 5   PLATELETS Thousands/uL 172 186   NEUTROS ABS Thousands/µL 6 70 6 14            Results from last 7 days   Lab Units 01/02/20  1440 01/02/20  1116   SODIUM mmol/L 128* 130*   POTASSIUM mmol/L 2 9* 2 9*   CHLORIDE mmol/L 92* 92*   CO2 mmol/L 27 29   ANION GAP mmol/L 9 9   BUN mg/dL 16 15   CREATININE mg/dL 1 34* 1 55*   EGFR ml/min/1 73sq m 42 35   CALCIUM mg/dL 10 3* 10 3*   MAGNESIUM mg/dL 2 0  --             Results from last 7 days   Lab Units 01/02/20  1440 01/02/20  1116   AST U/L 273* 263*   ALT U/L 100* 98*   ALK PHOS U/L 323* 322*   TOTAL PROTEIN g/dL 6 2* 6 1*   ALBUMIN g/dL 2 2* 2 1*   TOTAL BILIRUBIN mg/dL 6 54* 6 00*            Results from last 7 days   Lab Units 01/02/20  1440 01/02/20  1116   GLUCOSE RANDOM mg/dL 89 90           Results from last 7 days   Lab Units 01/02/20  1440   PROTIME seconds 17 1*   INR   1 47*   PTT seconds 36      Results from last 7 days   Lab Units 01/02/20  1440   LIPASE u/L 161         ED Treatment:              Medication Administration from 01/02/2020 1352 to 01/02/2020 1936        Date/Time Order Dose Route Action Comments       01/02/2020 1652 sodium chloride 0 9 % bolus 1,000 mL 1,000 mL Intravenous New Bag         01/02/2020 1810 potassium chloride (K-DUR,KLOR-CON) CR tablet 40 mEq 40 mEq Oral Given         01/02/2020 1919 potassium chloride 20 mEq IVPB (premix)   Intravenous Restarted         01/02/2020 1901 metoclopramide (REGLAN) tablet 10 mg 10 mg Oral Given            Medical History        Past Medical History:   Diagnosis Date    Breast CA ([address] )      GERD (gastroesophageal reflux disease)      Hyperlipidemia           Present on Admission:   Liver metastasis ([facility] )     Admitting Diagnosis: Hyponatremia [E87 1]  Jaundice [R17]  Elevated liver enzymes [R74 8]  Abnormal laboratory test result [R89 9]  Metastatic disease (H[facility]) [C79 9]  Liver metastasis (HCC) [C78 7]  Malignant neoplasm of right breast in female, estrogen receptor positive, unspecified site of breast ([facility] ) [C50 911, Z17 0]  Age/Sex: 62 y o  female  Admission Orders: 1/2/2020 1737 Observation and changed 1/3/2020 1155 to inpatient  Scheduled Medications:  Medications:  busPIRone 15 mg Oral Daily   enoxaparin 40 mg Subcutaneous Daily      Continuous IV Infusions:  sodium chloride 100 mL/hr Intravenous Continuous      PRN Meds:  famotidine 20 mg Oral Daily PRN   metoclopramide 10 mg Oral 4x Daily PRN      Telemetry     IP CONSULT TO ONCOLOGY  IP CONSULT TO GASTROENTEROLOGY     Network Utilization Review Department  [email or web address]  ATTENTION: Please call with any questions or concerns to [phone] and carefully listen to the prompts so that you are directed to the right person  All voicemails are confidential   Gilbert Pardo all requests for admission clinical reviews, approved or denied determinations and any other requests to dedicated fax number below belonging to the campus where the patient is receiving treatment   List of dedicated fax numbers for the Facilities:  FACILITY NAME UR FAX NUMBER   ADMISSION DENIALS (Administrative/Medical Necessity) [phone]   PARENT CHILD HEALTH (Maternity/NICU/Pediatrics) [phone]   Isaias Orantes [phone]   Nihdi Promise [phone]   [address] [phone]             Revision History

## 2020-01-08 PROBLEM — E87.2 METABOLIC ACIDOSIS: Status: ACTIVE | Noted: 2020-01-01

## 2020-01-08 NOTE — ASSESSMENT & PLAN NOTE
· Admission sodium 130  · Sodium today 129  · Nephrology input appreciated with the following recommendations:  · Possibly etiology due to volume depletion as well as SIADH  · Patient on a 1500 Fluid restriction  · Patient started on Sodium bicarbonate tablet 650mg BID  · May consider lasix due to underlying LL edema, await urine studies   · a m  Cortisol,   · repeat urine studies  · Urine osmolality 291, urine sodium 11, serum osmolality 272 indicating true hyponatremia    TSH within normal limit

## 2020-01-08 NOTE — TREATMENT PLAN
My partner had seen the patient earlier today but I was asked by the primary attending about the case so I discussed with him  He noted that when the patient was ordered some sodium tablets today she vomited them up and could not take them and felt that she was slightly volume overloaded  I recommended him to continue with the fluid restriction sodium bicarb tablets as ordered and to give 1 dose of IV Lasix that would help excrete more free water

## 2020-01-08 NOTE — ASSESSMENT & PLAN NOTE
· Creatinine 1 3 today  · Baseline creatinine has been around 0 9-1 0    · Admission creatinine was 1 5 on 1/2/2020  · Renal function did improve after admission to creatinine 1 0 but slight increase in creatinine to 1 3 today  · Continue to monitor      · postvoid residual/Bladder scan

## 2020-01-08 NOTE — PROGRESS NOTES
Progress Note - Chivo Cornell 1955, 62 y o  female MRN: [id number]    Unit/Bed#: S -39 Encounter: [id number]    Primary Care Provider: Darren Jewell DO   Date and time admitted to hospital: 1/2/2020  2:11 PM    * Hyponatremia  Assessment & Plan  · Admission sodium 130  · Sodium today 129  · Nephrology input appreciated with the following recommendations:  · Possibly etiology due to volume depletion as well as SIADH  · Patient on a 1500 Fluid restriction  · Patient started on Sodium bicarbonate tablet 650mg BID  · May consider lasix due to underlying LL edema, await urine studies   · a m  Cortisol,   · repeat urine studies  · Urine osmolality 291, urine sodium 11, serum osmolality 272 indicating true hyponatremia  TSH within normal limit    Abnormal LFTs (liver function tests)  Assessment & Plan  · Patient with elevated AST, ALT, alk-phos and bilirubin  · This has been increasing over last few weeks  · No evidence of significant extra biliary obstruction on CT   · Oncology contacted by ER  Recommended admitting patient for GI evaluation  · GI consult appreciated, with the following recommendations:  · MRI/MRCP: no obstruction noted  · Trend LFTs  Malignant neoplasm of right breast in female, estrogen receptor positive ([facility] )  Assessment & Plan  · Follows with Dr Erma Lester as outpatient   · Currently on Palbociclib daily  · Now with worsening metastatic liver disease  · Oncology consult appreciated with the following recommendations:   · Continue the current treatment regimen with palbociclib this admission (3 weeks on/1 week off regimen)  · She will continue as scheduled outpatient fluvestrant, next dose 1/9  · This was discussed directly with Dr Mireles   · Recommend follow GI recommendations if there is biliary obstruction and possibly stent placement, otherwise treatment for elevated transaminases/hyperbilirubinemia will continue to be systemic anticancer therapy    · Recommend monitor serum calcium levels this admission due to the patient's history of hypercalcemia  She has had bisphosphonate approximately 1 month ago  Tachycardia  Assessment & Plan  · Patient noted to have elevated HR in 100s and above since admission  · Patient denies any clinical symptoms  ie SOB, chest pain, palpitations  · Given patient's underlying hx of metastatic cancer  · CTA PE study to r/u PE: Per Pulmonology, negative for PE   · Patient admits to refusing to take her Buspar, possibly symptoms of Tachycardia are associated with withdrawal of medication     Liver metastasis ([facility] )  Assessment & Plan  · Seen on the CT abdomen  · MRI does not show any obstruction  · Will continue to follow LFTs  Metabolic acidosis  Assessment & Plan  · Continue Sodium Bicarbonate   · Monitor BMP  · Nephrology following     Elevated serum creatinine-resolved as of 1/5/2020  Assessment & Plan  · Creatinine 1 3 today  · Baseline creatinine has been around 0 9-1 0    · Admission creatinine was 1 5 on 1/2/2020  · Renal function did improve after admission to creatinine 1 0 but slight increase in creatinine to 1 3 today  · Continue to monitor  · postvoid residual/Bladder scan         VTE Pharmacologic Prophylaxis:   Pharmacologic: Enoxaparin (Lovenox)  Mechanical VTE Prophylaxis in Place: Yes    Discussions with Specialists or Other Care Team Provider: Discussed case with my attending, Nephrology on board     Education and Discussions with Family / Patient: I discussed the plan with the patient and family at bedside, all questions were answered  Patient was told to reach out if any further questions arise  Current Length of Stay: 5 day(s)    Current Patient Status: Inpatient     Discharge Plan / Estimated Discharge Date: To be determined  Code Status: Level 1 - Full Code      Subjective:   Patient offers no complaints  No significant overnight events reported   Pertinent negatives include: Chest pain, palpitations, leg edema, dyspnea, cough, hemoptysis, nausea, vomiting, diarrhea, abdominal pain  Objective:     Vitals:   Temp (24hrs), Av 7 °F (36 5 °C), Min:97 6 °F (36 4 °C), Max:97 8 °F (36 6 °C)    Temp:  [97 6 °F (36 4 °C)-97 8 °F (36 6 °C)] 97 8 °F (36 6 °C)  HR:  [108-128] 114  Resp:  [16-20] 20  BP: (109-127)/(67-81) 127/76  SpO2:  [96 %-97 %] 96 %  Body mass index is 23 22 kg/m²  Input and Output Summary (last 24 hours): Intake/Output Summary (Last 24 hours) at 2020 0956  Last data filed at 2020 0915  Gross per 24 hour   Intake 790 ml   Output --   Net 790 ml       Physical Exam:     Physical Exam   Constitutional: She is oriented to person, place, and time  She appears well-developed and well-nourished  HENT:   Head: Atraumatic  Eyes: Pupils are equal, round, and reactive to light  EOM are normal    Neck: Normal range of motion  Neck supple  Cardiovascular: Normal rate, regular rhythm, normal heart sounds and intact distal pulses  Exam reveals no gallop and no friction rub  No murmur heard  Pulmonary/Chest: Effort normal and breath sounds normal    Abdominal: Soft  Bowel sounds are normal  She exhibits no distension  There is no tenderness  Musculoskeletal: Normal range of motion  She exhibits edema  She exhibits no tenderness  Neurological: She is alert and oriented to person, place, and time  Skin: Skin is warm and dry  There is pallor  Nursing note and vitals reviewed      Additional Data:     Labs:    Results from last 7 days   Lab Units 20  0406  20  0947  20  1011   WBC Thousand/uL 3 33*   < > 4 83   < > 5 49   HEMOGLOBIN g/dL 12 4   < > 13 0   < > 13 1   HEMATOCRIT % 34 7*   < > 36 0   < > 36 2   PLATELETS Thousands/uL 71*   < > 103*   < > 138*   NEUTROS PCT %  --   --   --   --  82*   LYMPHS PCT %  --   --   --   --  16   LYMPHO PCT %  --   --  7*  --   --    MONOS PCT %  --   --   --   --  2*   MONO PCT %  --   --  2*  --   --    EOS PCT %  --   --  0  --  0    < > = values in this interval not displayed  Results from last 7 days   Lab Units 01/08/20  0406   POTASSIUM mmol/L 3 9   CHLORIDE mmol/L 99*   CO2 mmol/L 23   BUN mg/dL 17   CREATININE mg/dL 1 30   CALCIUM mg/dL 8 3   ALK PHOS U/L 222*   ALT U/L 48   AST U/L 145*     Results from last 7 days   Lab Units 01/02/20  1440   INR  1 47*       * I Have Reviewed All Lab Data Listed Above  * Additional Pertinent Lab Tests Reviewed: All Labs Within Last 24 Hours Reviewed    Imaging:    Imaging Reports Reviewed Today Include: none  Imaging Personally Reviewed by Myself Includes:  none    Recent Cultures (last 7 days):           Last 24 Hours Medication List:     Current Facility-Administered Medications:  acetaminophen 325 mg Oral Q8H PRN Nic Dao MD   busPIRone 15 mg Oral Daily Kitty Smith MD   enoxaparin 40 mg Subcutaneous Q24H [address], [address], MD   famotidine 20 mg Oral Daily PRN RICARDA Barajas   metoclopramide 10 mg Oral 4x Daily PRN Kitty Smith MD   palbociclib 100 mg Oral Daily Nic Dao MD   sodium bicarbonate 650 mg Oral BID after meals Tete Wynn MD   sodium chloride 2 g Oral Once Aliya Villalpando MD   zolpidem 2 5 mg Oral HS PRN Ifeanyi Durand DO        Today, Patient Was Seen By: Srinath Harris MD    ** Please Note: This note has been constructed using a voice recognition system   **

## 2020-01-08 NOTE — PROGRESS NOTES
NEPHROLOGY PROGRESS NOTE   Jeannie Loaiza 62 y o  female MRN: [id number]  Unit/Bed#: S -09 Encounter: [id number]    ASSESSMENT & PLAN:  78-year-old female with a past medical history of metastatic breast cancer stage IIIA, status post mastectomy, with positive lymph nodes, status post chemotherapy, then treated with adjuvant hormonal therapy for 5 years, recent admission in December 2019 with hypercalcemia and was found to have metastatic disease to the liver, requiring intravenous infusions of fluid as an outpatient, who is now presenting with abnormal bilirubin levels  Nephrology is consulted for hyponatremia  · Hyponatremia  · Admission sodium was 130  · Initially sodium decreased to 128 with IV fluids but then again improved with IV fluid on 01/04 sodium level of 130  On 01/07 sodium dropped to 129 and was started on sodium bicarbonate tablet due to bicarbonate level being 21  · Urine osmolality 291, urine sodium 11, serum osmolality 272 indicating true hyponatremia  TSH within normal limit  · Will check a m  Cortisol, repeat urine studies , now that pt is volume replete  · Continue sodium bicarbonate tablet at current rate  Will give 2 g of salt tablet x1 dose and decide on further dosing based on urine studies  May also consider Lasix as she has lower extremity edema but 1st would wait for urine studies result  · Continue fluid restriction-will decrease to 1500 mL per day  · Repeat BMP at 4:00 p m  · Etiology:  Likely combination of volume depletion as well as underlying SIADH  · Low bicarbonate/metabolic acidosis:  Bicarbonate level improving  Continue sodium bicarbonate at current dose  Continue to monitor  · Acute kidney injury:, POA:  Creatinine increased to 1 3 today  Baseline creatinine has been around 0 9-1 0  Admission creatinine was 1 5 on January 2nd  Renal function did improve after admission to creatinine 1 0 but slight increase in creatinine to 1 3 today    Would continue to monitor  Check postvoid residual  · Metastatic breast cancer:  Continue management per primary team   On chemotherapy with Ibrance  · Abnormal LFTs:  No evidence of biliary dilatation on CT  Continue management per primary team follow-up GI recommendations  Discussed with Dr Kylie Rodriguez  Addendum:  Urine sodium 11, urine osmolality 530  Noted patient received IV Lasix today  Will start on Lasix 20 mg daily from tomorrow  Started on salt tablet 2 g twice daily  Follow-up repeat BMP  SUBJECTIVE:  No shortness of breath  No chest pain    OBJECTIVE:  Current Weight: Weight - Scale: 59 5 kg (131 lb 1 6 oz)  Vitals:    01/08/20 0657   BP: 127/76   Pulse: (!) 114   Resp: 20   Temp: 97 8 °F (36 6 °C)   SpO2: 96%       Intake/Output Summary (Last 24 hours) at 1/8/2020 0925  Last data filed at 1/8/2020 0915  Gross per 24 hour   Intake 790 ml   Output --   Net 790 ml       Physical Exam  General:  Ill looking, awake  Eyes: Conjunctivae pink,  Sclera anicteric  ENT: lips and mucous membranes moist  Neck: supple   Chest: Clear to Auscultation both lungs,  no crackles, ronchus or wheezing  CVS: S1 & S2 present, normal rate, regular rhythm, no murmur    Abdomen: soft, non-tender, non-distended, Bowel sounds normoactive  Extremities: no edema of  legs  Skin: no rash  Neuro: awake, alert, oriented x3      Medications:    Current Facility-Administered Medications:     acetaminophen (TYLENOL) tablet 325 mg, 325 mg, Oral, Q8H PRN, Omer Chow MD, 325 mg at 01/04/20 2034    busPIRone (BUSPAR) tablet 15 mg, 15 mg, Oral, Daily, Sam Harris MD, 15 mg at 01/07/20 2113    enoxaparin (LOVENOX) subcutaneous injection 40 mg, 40 mg, Subcutaneous, Q24H [address], Afia Hayward MD, 40 mg at 01/08/20 0925    famotidine (PEPCID) tablet 20 mg, 20 mg, Oral, Daily PRN, RICARDA Estrada, 20 mg at 01/07/20 1645    metoclopramide (REGLAN) tablet 10 mg, 10 mg, Oral, 4x Daily PRN, Sam Harris MD, 10 mg at 01/07/20 1508    palbociclib (IBRANCE) capsule 100 mg, 100 mg, Oral, Daily, Veronique Ha MD, 100 mg at 01/07/20 1714    sodium bicarbonate tablet 650 mg, 650 mg, Oral, BID after meals, Iza Pineda MD, 650 mg at 01/07/20 1718    zolpidem (AMBIEN) tablet 2 5 mg, 2 5 mg, Oral, HS PRN, Mark Barahona DO, 2 5 mg at 01/05/20 2137    Invasive Devices:        Lab Results:   Results from last 7 days   Lab Units 01/08/20  0406 01/07/20  0505 01/06/20  0518  01/03/20  1011 01/02/20  1440   WBC Thousand/uL 3 33* 3 52* 3 94*   < > 5 49 8 19   HEMOGLOBIN g/dL 12 4 12 8 12 8   < > 13 1 15 6*   HEMATOCRIT % 34 7* 35 9 36 1   < > 36 2 43 4   PLATELETS Thousands/uL 71* 85* 88*   < > 138* 172   POTASSIUM mmol/L 3 9 3 8 3 3*   < > 3 4* 2 9*   CHLORIDE mmol/L 99* 98* 100   < > 100 92*   CO2 mmol/L 23 21 20*   < > 23 27   BUN mg/dL 17 13 13   < > 14 16   CREATININE mg/dL 1 30 1 08 1 08   < > 1 09 1 34*   CALCIUM mg/dL 8 3 7 8* 7 8*   < > 8 4 10 3*   MAGNESIUM mg/dL  --   --   --   --   --  2 0   ALK PHOS U/L 222*  --   --   --  251* 323*   ALT U/L 48  --   --   --  68 100*   AST U/L 145*  --   --   --  192* 273*    < > = values in this interval not displayed  Previous work up:  CTA chest with IV contrast  IMPRESSION:     Absent enhancement in segment on subsegmental apical right upper lobe pulmonary arterial branches consistent with age indeterminate but still possibly acute right upper lobe pulmonary embolus  No other pulmonary emboli noted      Hypoventilatory changes  No pulmonary mass or consolidative airspace opacity in the lungs      Extensive hepatic metastatic disease  Osseous metastatic disease          Portions of the record may have been created with voice recognition software  Occasional wrong word or "sound a like" substitutions may have occurred due to the inherent limitations of voice recognition software  Read the chart carefully and recognize, using context, where substitutions have occurred  If you have any questions, please contact the dictating provider

## 2020-01-08 NOTE — ASSESSMENT & PLAN NOTE
· Follows with Dr Cr Wetzel as outpatient   · Currently on Palbociclib daily  · Now with worsening metastatic liver disease  · Oncology consult appreciated with the following recommendations:   · Continue the current treatment regimen with palbociclib this admission (3 weeks on/1 week off regimen)  · She will continue as scheduled outpatient fluvestrant, next dose 1/9  · This was discussed directly with Dr Mireles   · Recommend follow GI recommendations if there is biliary obstruction and possibly stent placement, otherwise treatment for elevated transaminases/hyperbilirubinemia will continue to be systemic anticancer therapy  · Recommend monitor serum calcium levels this admission due to the patient's history of hypercalcemia  She has had bisphosphonate approximately 1 month ago

## 2020-01-08 NOTE — ASSESSMENT & PLAN NOTE
· Patient noted to have elevated HR in 100s and above since admission  · Patient denies any clinical symptoms  ie SOB, chest pain, palpitations  · Given patient's underlying hx of metastatic cancer  · CTA PE study to r/u PE: Per Pulmonology, negative for PE   · Patient admits to refusing to take her Buspar, possibly symptoms of Tachycardia are associated with withdrawal of medication

## 2020-01-09 PROBLEM — R00.0 TACHYCARDIA: Status: RESOLVED | Noted: 2020-01-01 | Resolved: 2020-01-01

## 2020-01-09 PROBLEM — E87.2 METABOLIC ACIDOSIS: Status: RESOLVED | Noted: 2020-01-01 | Resolved: 2020-01-01

## 2020-01-09 NOTE — PROGRESS NOTES
NEPHROLOGY PROGRESS NOTE   Nicci Albright 62 y o  female MRN: [id number]  Unit/Bed#: S -57 Encounter: [id number]    ASSESSMENT & PLAN:  50-year-old female with a past medical history of metastatic breast cancer stage IIIA, status post mastectomy, with positive lymph nodes, status post chemotherapy, then treated with adjuvant hormonal therapy for 5 years, recent admission in December 2019 with hypercalcemia and was found to have metastatic disease to the liver, requiring intravenous infusions of fluid as an outpatient, who is now presenting with abnormal bilirubin levels  Nephrology is consulted for hyponatremia  · Hyponatremia  · Admission sodium was 130  · Initially sodium decreased to 128 with IV fluids but then again improved with IV fluid on 01/04 sodium level of 130  On 01/07 sodium dropped to 129 and was started on sodium bicarbonate tablet due to bicarbonate level being 21  · Urine osmolality 291, urine sodium 11, serum osmolality 272 indicating true hyponatremia  TSH within normal limit  · Repeat urine studies from 01/08 showed urine sodium 11 and urine osmolality 530  A m  Cortisol is pending from today  Will follow up as outpatient  · Hyponatremia improving with combination of salt tablet, sodium bicarbonate tablet and diuretics  Sodium increased to 134 today  Bicarbonate level up to 25  Would stop sodium bicarbonate tablet and decrease salt tablet to 1 g t i d  From previous dose of 2 g twice daily  Continue Lasix 20 mg daily and fluid restriction 1500 mL per day  · Replaced potassium which could also further help with the sodium  Would continue potassium chloride 10 mEq daily as outpatient  · Repeat BMP in 4 days and will arrange for outpatient follow-up  · Etiology:  Likely combination of volume depletion as well as underlying SIADH  · Low bicarbonate/metabolic acidosis:  Bicarbonate level improving  Bicarbonate level is 25    Will stop oral sodium bicarbonate and continue to monitor · Acute kidney injury:, POA:  Creatinine increased to 1 3 on 01/08 but improving to 1 26  May have to except slight increase in creatinine with use of diuretics     Baseline creatinine has been around 0 9-1 0  Admission creatinine was 1 5 on January 2nd  Bladder scan showed postvoid residual only 132 mL on 01/08  · Hypokalemia:  Replaced  Magnesium within normal limit  · Metastatic breast cancer:  Continue management per primary team   On chemotherapy with Ibrance  · Abnormal LFTs:  No evidence of biliary dilatation on CT  Continue management per primary team follow-up GI recommendations  Discussed with Dr Eron Bush  Following medication at the time of discharge  -salt tablet 1 g 3 times a day   -Lasix 20 mg daily  -no need for sodium bicarbonate  -potassium chloride 10 mEq Q daily  -fluid restriction 1500 mL per day  -repeat BMP in 4 days  Office informed to schedule outpatient follow-up in 1-2 weeks  -patient is stable for outpatient care from Nephrology side         SUBJECTIVE:  Patient had vomiting with salt tablet yesterday  No shortness of breath  Urine output documented only 400 mL    OBJECTIVE:  Current Weight: Weight - Scale: 59 5 kg (131 lb 1 6 oz)  Vitals:    01/09/20 0653   BP: 121/75   Pulse: 94   Resp: 18   Temp: 98 1 °F (36 7 °C)   SpO2: 99%       Intake/Output Summary (Last 24 hours) at 1/9/2020 0905  Last data filed at 1/9/2020 0859  Gross per 24 hour   Intake 1110 ml   Output 400 ml   Net 710 ml       Physical Exam  General:  Ill looking, awake  Eyes: Conjunctivae pink,  Sclera anicteric  ENT: lips and mucous membranes moist  Neck: supple   Chest: Clear to Auscultation both lungs,  no crackles, ronchus or wheezing  CVS: S1 & S2 present, normal rate, regular rhythm, no murmur    Abdomen: soft, non-tender, non-distended, Bowel sounds normoactive  Extremities:  1+ bilateral lower extremity edema  Skin: no rash  Neuro: awake, alert, oriented        Medications:    Current Facility-Administered Medications:     busPIRone (BUSPAR) tablet 15 mg, 15 mg, Oral, Daily, Edgar Hatch MD, 15 mg at 01/08/20 2038    enoxaparin (LOVENOX) subcutaneous injection 40 mg, 40 mg, Subcutaneous, Q24H [facility], Meera Lan MD, 40 mg at 01/09/20 0830    famotidine (PEPCID) tablet 20 mg, 20 mg, Oral, Daily PRN, RICARDA Rider, 20 mg at 01/07/20 1645    furosemide (LASIX) tablet 20 mg, 20 mg, Oral, Daily, Vicie Krabbe, MD, 20 mg at 01/09/20 0830    ondansetron (ZOFRAN) injection 4 mg, 4 mg, Intravenous, Q6H PRN, Meera Lan MD, 4 mg at 01/08/20 1247    palbociclib (IBRANCE) capsule 100 mg, 100 mg, Oral, Daily, Sadia Cornell MD, 100 mg at 01/08/20 1818    sodium bicarbonate tablet 650 mg, 650 mg, Oral, BID after meals, Geni Sellers MD, 650 mg at 01/09/20 0830    sodium chloride tablet 2 g, 2 g, Oral, BID With Meals, Vicie Krabbe, MD, 2 g at 01/09/20 0830    zolpidem (AMBIEN) tablet 2 5 mg, 2 5 mg, Oral, HS PRN, Mark Barahona DO, 2 5 mg at 01/05/20 2137    Invasive Devices:        Lab Results:   Results from last 7 days   Lab Units 01/09/20  0843 01/08/20  1921 01/08/20  0406 01/07/20  0505  01/03/20  1011 01/02/20  1440   WBC Thousand/uL 2 93*  --  3 33* 3 52*   < > 5 49 8 19   HEMOGLOBIN g/dL 13 8  --  12 4 12 8   < > 13 1 15 6*   HEMATOCRIT % 39 3  --  34 7* 35 9   < > 36 2 43 4   PLATELETS Thousands/uL 67*  --  71* 85*   < > 138* 172   POTASSIUM mmol/L  --  3 8 3 9 3 8   < > 3 4* 2 9*   CHLORIDE mmol/L  --  99* 99* 98*   < > 100 92*   CO2 mmol/L  --  22 23 21   < > 23 27   BUN mg/dL  --  19 17 13   < > 14 16   CREATININE mg/dL  --  1 36* 1 30 1 08   < > 1 09 1 34*   CALCIUM mg/dL  --  8 5 8 3 7 8*   < > 8 4 10 3*   MAGNESIUM mg/dL  --   --   --   --   --   --  2 0   ALK PHOS U/L  --   --  222*  --   --  251* 323*   ALT U/L  --   --  48  --   --  68 100*   AST U/L  --   --  145*  --   --  192* 273*    < > = values in this interval not displayed         Previous work up:  CTA chest with IV contrast  IMPRESSION:     Absent enhancement in segment on subsegmental apical right upper lobe pulmonary arterial branches consistent with age indeterminate but still possibly acute right upper lobe pulmonary embolus  No other pulmonary emboli noted      Hypoventilatory changes  No pulmonary mass or consolidative airspace opacity in the lungs      Extensive hepatic metastatic disease  Osseous metastatic disease          Portions of the record may have been created with voice recognition software  Occasional wrong word or "sound a like" substitutions may have occurred due to the inherent limitations of voice recognition software  Read the chart carefully and recognize, using context, where substitutions have occurred  If you have any questions, please contact the dictating provider

## 2020-01-09 NOTE — SOCIAL WORK
[facility] informed of pt's discharge today  AVS faxed over to [phone]  Discussed with pt regarding FELICITY appointment as pt has [facility] and pt declined stating she preferred to make the appointment herself when she gets home  Call received from Mackenzie Ambriz [phone] from [facility] introducing herself as the discharge   She requested for CM to provided her contact information to pt for any assistance when pt goes home   Number provided to pt and also placed on AVS

## 2020-01-09 NOTE — DISCHARGE INSTR - AVS FIRST PAGE
Dear Matilda Huizar,     It was our pleasure to care for you here at [facility]  It is our hope that we were always able to exceed the expected standards for your care during your stay  You were hospitalized due to Abnormal lab tests  You were cared for on the 4th floor by Estefanía Barajas MD under the service of Brayden Dobson MD with the [facility] who covers for your primary care physician (PCP), Aide Rodriguez DO, while you were hospitalized  If you have any questions or concerns related to this hospitalization, you may contact us at [phone]  For follow up as well as any medication refills, we recommend that you follow up with your primary care physician  A registered nurse will reach out to you by phone within a few days after your discharge to answer any additional questions that you may have after going home  However, at this time we provide for you here, the most important instructions / recommendations at discharge:     · Notable Medication Adjustments -   · You have new medications added to your list including the following:  · Sodium Chloride 1g 3 (three) times daily with meals  · Potassium chloride 10mEq 1 (once) daily  · Furosemide 20mg 1 (once) daily   · Testing Required after Discharge -   · Blood work 4 days after discharge   · Important follow up information -   · Oncology will reach out to you for rescheduling your appointment for chemo injection, if you do not receive a call please reach out to schedule your appointment   · Other Instructions -   · Once discharged you will have a 1500 fluid restriction, please monitor your intake  · Please review this entire after visit summary as additional general instructions including medication list, appointments, activity, diet, any pertinent wound care, and other additional recommendations from your care team that may be provided for you        Sincerely,     Estefanía Barajas MD

## 2020-01-09 NOTE — DISCHARGE INSTRUCTIONS
Repeat blood work in 4 days  Fluid restriction 1500 ml/day   Continue salt tablets 1 gm three times daily and lasix 20 mg daily    Follow up with [facility] for Faslodex on 1/10 at 2:00pm

## 2020-01-09 NOTE — DISCHARGE SUMMARY
Discharge- [facility] [place] 1955, 62 y o  female MRN: [id number]    Unit/Bed#: S -28 Encounter: [id number]    Primary Care Provider: Travis Quarles DO   Date and time admitted to hospital: 1/2/2020  2:11 PM  * Hyponatremia  Assessment & Plan  · Admission sodium 130  · Sodium today 134, improved  · Nephrology input appreciated with the following recommendations:  · Possibly etiology due to volume depletion as well as SIADH  · Continue on a 1500 Fluid restriction  · Continue Sodium Tabs 1g TID  · Continue Lasix 20mg daily  · Start Potassium Choride 10mEq daily  · a m  Cortisol- pending   · Urine osmolality 291, urine sodium 11, serum osmolality 272 indicating true hyponatremia  · TSH within normal limit    Abnormal LFTs (liver function tests)  Assessment & Plan  · Patient with elevated AST, ALT, alk-phos and bilirubin  · This has been increasing over last few weeks  · No evidence of significant extra biliary obstruction on CT   · Oncology contacted by ER  Recommended admitting patient for GI evaluation  · GI consult appreciated, with the following recommendations:  · MRI/MRCP: no obstruction noted  · Trend LFTs  Malignant neoplasm of right breast in female, estrogen receptor positive ([address] )  Assessment & Plan  · Follows with Dr Elise Brennan as outpatient   · Currently on Palbociclib daily  · Now with worsening metastatic liver disease  · Oncology consult appreciated with the following recommendations:   · Continue the current treatment regimen with palbociclib this admission (3 weeks on/1 week off regimen)  · She will continue as scheduled outpatient fluvestrant, orginally schedled for dose 1/9, rescheduled to 1/10 at 2:00pm     · Recommend monitor serum calcium levels this admission due to the patient's history of hypercalcemia  She has had bisphosphonate approximately 1 month ago  Liver metastasis ([address] )  Assessment & Plan  · Seen on the CT abdomen  · MRI does not show any obstruction  · Will continue to follow LFTs  Discharging Resident Physician: Kavitha Grullon MD  Attending: Mati Hogan MD  PCP: Calderon Allen DO  Admission Date: 1/2/2020  Discharge Date: 01/09/20    Disposition:     Home    Reason for Admission: Patient was found to have abnormal LFTs on weekly CBC and metabolic derangements     Consultations During Hospital Stay:  · GI  · Pulmonary   · Nephrology  · Oncology    Procedures Performed:     · CTA PE study  · MRI  · CT abdomen pelvis     Significant Findings / Test Results:     · CT Abdomen/Pelvis: Innumerable hepatic lesions have developed in the interval consistent with metastatic disease  Moderate abdominal pelvic ascites  · MRI abdomen:  No intrahepatic or extrahepatic bile ductal dilation  Normal CBD diameter  Numerous hepatic metastases are again seen  · CTA chest PE: Absent enhancement in segment on subsegmental apical right upper lobe pulmonary arterial branches consistent with age indeterminate but still possibly acute right upper lobe pulmonary embolus  No other pulmonary emboli noted    Incidental Findings:   · As stated above     Test Results Pending at Discharge (will require follow up): · BMP 4 days after discharge      Outpatient Tests Requested:  · BMP    Complications:  none    Hospital Course:     Leandro Espinal is a 62 y o  female patient who originally presented to the hospital on 1/2/2020   Past medical history of metastatic breast cancer with liver metastasis who presented following outpatient lab testing showed transaminitis and worsening hyperbilirubinemia   GI was consulted and underwent MRCP   MRCP did not show any obstructive process/any identifiable stentable lesion   It was recommended to continue to monitor LFT's   She was eval for PE due to continued elevated HR and given her risk for PE and CTA findings   She was initially started on therapeutic lovenox though pulm was consulted, reviewed imaging and felt that patient does not have acute PE but rather absence of blood flow to the superior branch of the R pulm artery is 2/2 radiation therapy/injury  Bekah Lease based lovenox was subsequently discontinued on 1/7/20    Patient required additional hospital stay due to worsening hyponatremia - fluid restriction started yesterday 1/7/20 along with NaHCO3 tabs, as well NaCl tabs have been started 1/8/20  Patient unfortunately missed her chemotherapy appt which is now scheduled for tomorrow at 2:00pm     Condition at Discharge: stable     Discharge Day Visit / Exam:     Subjective:  Patient offers no complaints  No significant overnight events reported  Pertinent negatives include: Chest pain, palpitations, leg edema, dyspnea, cough, hemoptysis, nausea, vomiting, diarrhea, abdominal pain  Vitals: Blood Pressure: 121/75 (01/09/20 0653)  Pulse: 94 (01/09/20 0653)  Temperature: 98 1 °F (36 7 °C) (01/09/20 0653)  Temp Source: Oral (01/09/20 0653)  Respirations: 18 (01/09/20 0653)  Height: 5' 3" (160 cm) (01/02/20 1937)  Weight - Scale: 59 5 kg (131 lb 1 6 oz) (01/02/20 1937)  SpO2: 99 % (01/09/20 0653)  Exam:   Physical Exam   Constitutional: She appears well-developed and well-nourished  HENT:   Head: Atraumatic  Eyes: Pupils are equal, round, and reactive to light  EOM are normal    Neck: Normal range of motion  Neck supple  Cardiovascular: Normal rate, regular rhythm, normal heart sounds and intact distal pulses  Exam reveals no gallop and no friction rub  No murmur heard  Pulmonary/Chest: Effort normal and breath sounds normal    Abdominal: Soft  Bowel sounds are normal  She exhibits no distension  There is no tenderness  Musculoskeletal: Normal range of motion  She exhibits no edema  Skin: Skin is warm and dry  No pallor  Nursing note and vitals reviewed  Discussion with Family: I discussed the plan with the patient and family at bedside, all questions were answered  Patient was told to reach out if any further questions arise  Discharge instructions/Information to patient and family:   See after visit summary for information provided to patient and family  Provisions for Follow-Up Care:  See after visit summary for information related to follow-up care and any pertinent home health orders  Planned Readmission: No plan at this time  Discharge Medications:  See after visit summary for reconciled discharge medications provided to patient and family        ** Please Note: This note has been constructed using a voice recognition system **

## 2020-01-09 NOTE — ASSESSMENT & PLAN NOTE
· Admission sodium 130  · Sodium today 134, improved  · Nephrology input appreciated with the following recommendations:  · Possibly etiology due to volume depletion as well as SIADH  · Continue on a 1500 Fluid restriction  · Continue Sodium Tabs 1g TID  · Continue Lasix 20mg daily  · Start Potassium Choride 10mEq daily  · a m  Cortisol- pending   · Urine osmolality 291, urine sodium 11, serum osmolality 272 indicating true hyponatremia      · TSH within normal limit

## 2020-01-09 NOTE — ASSESSMENT & PLAN NOTE
· Follows with Dr Morgan Magallanes as outpatient   · Currently on Palbociclib daily  · Now with worsening metastatic liver disease  · Oncology consult appreciated with the following recommendations:   · Continue the current treatment regimen with palbociclib this admission (3 weeks on/1 week off regimen)  · She will continue as scheduled outpatient fluvestrant, orginally schedled for dose 1/9, rescheduled to 1/10 at 2:00pm     · Recommend monitor serum calcium levels this admission due to the patient's history of hypercalcemia  She has had bisphosphonate approximately 1 month ago

## 2020-01-10 NOTE — TELEPHONE ENCOUNTER
Discussed with Dr Deysi Abdul  Patient can take oxycodone if she has had it in the past with no issues  However, it is the same as Percocet, that she states she can not take  Attempted to call patient with no answer

## 2020-01-10 NOTE — TELEPHONE ENCOUNTER
Pt was just released from hospital and stopped by stating that she was not given anything  for the pain  Can she get a prescription fill for that   (can't take percocet, tylenol and advil)  She used [facility] on [address]    Best number to reach is [phone]

## 2020-01-10 NOTE — TELEPHONE ENCOUNTER
Return call from patient's  Tameka Hirsch  Patient HAS taken oxycodone without any issues  Dr Omer Catalan team aware and script will be sent to recorded pharmacy  Patient's  verbalizes understanding

## 2020-01-10 NOTE — TELEPHONE ENCOUNTER
I left a message for patient to schedule a hospital follow up appointment for hyponatremia  Per message- Patient needs a repeat BMP this Monday on 1/13 and 1/20  Follow up in 2 weeks

## 2020-01-10 NOTE — PROGRESS NOTES
Patient to [facility] for Faslodex: Complaining of fatigue: Patient weak / jaundice: Lab work ( 01/09/20 ) reviewed: No parameters written: Injections given in Buttocks ( Left UQ / Right UQ ) without incident: No adverse reactions noted: Verified follow up appt with patient: Declined AVS

## 2020-01-13 PROBLEM — D69.6 THROMBOCYTOPENIA (HCC): Status: ACTIVE | Noted: 2020-01-01

## 2020-01-13 NOTE — TELEPHONE ENCOUNTER
D/w Dr Lorne Singh  He would like the patient to get one unit of platelets tomorrow  [facility] has an available appointment at 11am  Patient will get her type and screen drawn today and then see Dr Lorne Singh in the office after her infusion appointment  Made patient aware and she verbalized understanding and agreed to plan  Verbal readback occurred

## 2020-01-14 NOTE — TELEPHONE ENCOUNTER
Patient was seen by Dr Alexia Arenas today and did not check out  I reached out to the patients and checked to see if the 5 pm appointment on 2/4/20 would work for the patient  Patient was in agreement and verbalized understanding  Patient was scheduled for the above mentionmed appointment date and time

## 2020-01-14 NOTE — PROGRESS NOTES
Hematology / Oncology Outpatient Follow Up Note    Prudence Landeros 62 y o  female NSQ:[id number] [facility]:[id number]         Date:  1/14/2020    Assessment / Plan:  A 62 year old postmenopausal woman with stage IIIA right breast cancer with invasive lobular histology, grade 2, ER/MI positive HER-2 negative disease, diagnosed in 2010  She underwent mastectomy resulting in CHAPARRO  She had 5 positive lymph nodes  She was treated with adjuvant chemotherapy with [facility] followed by paclitaxel  She was subsequently treated with 5 years of adjuvant hormonal therapy with tamoxifen, since she was premenopausal  In August 2016, her postmenopausal condition was confirmed  Since then, she has been on anastrozole with no significant side effects  In December 2019, she was hospitalized with hypercalcemia which was treated with bisphosphonate as well as hydration  She was found to have metastatic disease in the liver which was confirmed by biopsy to be ER 95% positive, MI negative , HER2 negative disease  Her liver function test is elevated  She started systemic therapy with palbociclib and fluvestrant  So far, she has received 2 doses of fluvestrant  This is the end of 1st round of palbociclib  She recently developed progressive hyperbilirubinemia due to the diffuse liver metastasis  She also has progressive thrombocytopenia  This is due to the liver failure because of diffuse hepatic metastasis of breast cancer  Her performance status had been actively deteriorating  We had extensive discussion regarding further management  She still does not wish to know her prognosis  However, she is aware that this is not curable disease  Because of her poor performance status and liver failure due to the diffuse liver metastasis, she has a reasonable option of being in hospice care  She declined hospice care  I had separate long conversation with her  regarding her prognosis  I told him that she may live only for a few weeks    I asked him to give us a call if he thinks that she is ready for hospice care, which I expect will happen within a few weeks  All the patient and her 's questions were answered to their satisfaction  I am going to discontinue fluvestrant this is a concern of hematoma formation with coagulopathy and thrombocytopenia  She may continue with palbociclib  I will see her again in 3 weeks for follow-up  Subjective:      HPI:             Interval History:  A 81-year-old postmenopausal female with stage IIIA right breast cancer, grade 2, invasive lobular histology, ER/WA positive HER-2 negative disease  She underwent mastectomy with lymph node dissection followed by adjuvant chemotherapy with a c  followed by paclitaxel  She had 5 positive axillary lymph nodes at the time of surgery  Prior to the chemotherapy, she had regular menstrual cycle  She developed chemotherapy-induced menopause  She underwent adjuvant chemotherapy with AC , followed by paclitaxel  She was treated with 5 years of adjuvant hormonal therapy with tamoxifen  Her postmenopausal condition was confirmed in August 2016  Therefore, she is currently on anastrozole   In the past, her surgeon ordered CA 27, 29 which was mildly elevated   However, CT scan was negative for metastatic disease  Bone scan in November 2019 was negative for metastatic disease  In early December 2019, she was hospitalized to the [facility] with 2 weeks of nausea and vomiting  She was found to have hypercalcemia  CT scan shows diffuse liver mass which was biopsied  Biopsy showed metastatic adenocarcinoma, consistent with breast primary, ER 95% positive, WA negative, HER2 negative disease  In late December 2019, she started systemic therapy with palbociclib and fluvestrant  However, she has rapid progression of hyperbilirubinemia  Therefore, she was hospitalized    Repeated CT scan showed diffuse liver metastasis with no evidence of intrahepatic biliary dilatation  Therefore, stenting was not indicated  Lately, she has progressive thrombocytopenia  She received platelet transfusion today  She presents today for follow-up  She feels very weak  Her back pain is well controlled  She has mild shortness of breath  She has some bruises in upper extremities  She denied any nosebleed or gum bleeding  Her performance status is 3/4 on the ECOG scale  Objective:      Primary Diagnosis:     1  Right breast cancer stage IIIA (pT2, pN2, M0) grade 2, ER/DC positive, HER-2 negative disease with invasive lobular carcinoma  5 positive axillary lymph nodes  Diagnosed in December 2010       2  Liver metastasis, ER 95% positive, DC negative, HER2 negative disease  Diagnosed in December 2019       Cancer Staging:  Cancer Staging  No matching staging information was found for the patient         Previous Hematologic/ Oncologic Treatment:      1  Adjuvant chemotherapy with dose-dense AC x4, followed by weekly paclitaxel x12   2  Adjuvant hormonal therapy with tamoxifen from July 2011 through August 2016      Current Hematologic/ Oncologic Treatment:       Palbociclib and fluvestrant since late December 2019       Disease Status:      CHAPARRO status post mastectomy with axillary lymph node dissection      Test Results:     Pathology:           Radiology:     CT scan abdomen pelvis in early January 2019 showed diffuse liver metastasis with no intrahepatic bile duct dilatation      Laboratory:     CA 27, 29 was 6000  CA 15-3 was 19,000, in December 2019  See below for CBC and CMP      Physical Exam:        General Appearance:    Alert, oriented          Eyes:    PERRL   Ears:    Normal external ear canals, both ears   Nose:   Nares normal, septum midline   Throat:   Mucosa moist  Pharynx without injection      Neck:   Supple         Lungs:     Clear to auscultation bilaterally   Chest Wall:    No tenderness or deformity    Heart:    Regular rate and rhythm         Abdomen:     Abdominal distension probably due to ascites  The liver is palpable 5-6 cm below right costal margin                Extremities:   Extremities no cyanosis or edema         Skin:    Jaundice   Lymph nodes:   Cervical, supraclavicular, and axillary nodes normal   Neurologic:   CNII-XII intact, normal strength, sensation and reflexes     Throughout             Breast exam:   status post right mastectomy with TRAM flap reconstruction on the right  Status post left mastectomy without reconstruction  No palpable abnormality in her right reconstructed breast or left chest wall             ROS: Review of Systems   Constitutional:        Fatigue and weakness   All other systems reviewed and are negative  Imaging: Mri Abdomen W Wo Contrast And Mrcp    Result Date: 1/3/2020  Narrative: MRI OF THE ABDOMEN WITH AND WITHOUT CONTRAST WITH MRCP INDICATION: Metastatic breast cancer  Evaluate CBD  COMPARISON: CT 1/2/2020  TECHNIQUE:  The following pulse sequences were obtained:  Coronal and axial T2 with TE of 90 and 180 respectively, axial T2 with fat saturation, axial FIESTA fat-sat, axial T1-weighted in-and-out-of phase, axial DWI/ADC, pre-contrast axial T1 with fat saturation, post-contrast dynamic axial T1 with fat saturation at 20, 70, and 180 seconds, followed by coronal and 7 minute delayed axial T1 with fat saturation  3D MRCP images were obtained with radial thick slabs and projections  3D rendering was performed from the acquisition scanner  IV Contrast:  5 mL of gadobutrol injection (MULTI-DOSE) FINDINGS: LOWER CHEST:   Unremarkable  LIVER: Numerous hepatic metastases are again noted  BILE DUCTS:  No intrahepatic or extrahepatic bile duct dilation  Common bile duct is normal in caliber, measuring up to 3 mm  No choledocholithiasis, biliary stricture or suspicious mass  GALLBLADDER: Circumferential bladder wall thickening likely related to systemic factors   PANCREAS:  Normal  No main pancreatic ductal dilation  ADRENAL GLANDS:  Normal  SPLEEN:  Normal  KIDNEYS/PROXIMAL URETERS:  No hydroureteronephrosis  No suspicious renal mass  Left renal upper pole subcentimeter cyst  BOWEL:   No dilated loops of bowel  PERITONEUM/RETROPERITONEUM: Moderate volume of ascites  LYMPH NODES: Enlarged left para-aortic node measuring 1 7 x 1 1 cm (series 16, image 370)  VASCULAR STRUCTURES:  No aneurysm  ABDOMINAL WALL:  Unremarkable  OSSEOUS STRUCTURES: Multiple osseous metastases of the lumbar spine  For example: 1 7 x 2 4 cm lesion within L1 vertebral body (series 16, image 81)  Lower sternal metastasis is also seen (series 16, image 27)  Impression: No intrahepatic or extrahepatic bile ductal dilation  Normal CBD diameter  Numerous hepatic metastases are again seen  Multiple osseous metastases of the spine  Lower sternal metastasis  No pathologic fracture  Enlarged left para-aortic node, likely metastatic  Moderate volume of abdominal ascites  Workstation performed: [id number]     Cta Chest Pe Study    Result Date: 1/6/2020  Narrative: CTA - CHEST WITH IV CONTRAST - PULMONARY ANGIOGRAM INDICATION:   Suspected pulmonary embolus  High pretest probability  COMPARISON: None  TECHNIQUE: CTA examination of the chest was performed using angiographic technique according to a protocol specifically tailored to evaluate for pulmonary embolism  Axial, sagittal, and coronal 2D reformatted images were created from the source data and  submitted for interpretation  In addition, coronal 3D MIP postprocessing was performed on the acquisition scanner  Radiation dose length product (DLP) for this visit:  123 mGy-cm   This examination, like all CT scans performed in the [facility], was performed utilizing techniques to minimize radiation dose exposure, including the use of iterative reconstruction and automated exposure control   IV Contrast:  85 mL of iohexol (OMNIPAQUE)  FINDINGS: PULMONARY ARTERIAL TREE:  There is absent enhancement of apical right upper lobe pulmonary arteries, best seen on images 76 and 69 of series 601 as well as image 73/74 of series 603  No discrete filling defect is identified, however, there is well demonstrated enhancement within all of the other segmental and subsegmental branches in the upper lobes bilaterally  The findings are consistent with age indeterminate right upper lobe pulmonary embolus  LUNGS:  Lung markings are crowded secondary to low lung volumes  No suspicious pulmonary mass  Mild dependent atelectatic changes noted bilaterally  PLEURA:  Small bilateral costophrenic angle pleural effusions noted  HEART/GREAT VESSELS:  Unremarkable for patient's age  MEDIASTINUM AND [place]:  No mediastinal or hilar lymphadenopathy  Small sliding-type hiatal hernia  CHEST WALL AND LOWER NECK:   Surgical changes of prior left mastectomy  No axillary lymphadenopathy  VISUALIZED STRUCTURES IN THE UPPER ABDOMEN:  Extensive metastatic disease in the liver better evaluated on MRI performed January 3, 2020  Abdominal ascites  OSSEOUS STRUCTURES:  No lytic changes in the inferior T7 vertebral body consistent with metastatic disease  Lytic lesions noted in the lower sternum consistent with metastatic disease  A more subtle scattered sclerotic and lytic lesions in the osseous structures  No acute fracture  Impression: Absent enhancement in segment on subsegmental apical right upper lobe pulmonary arterial branches consistent with age indeterminate but still possibly acute right upper lobe pulmonary embolus  No other pulmonary emboli noted  Hypoventilatory changes  No pulmonary mass or consolidative airspace opacity in the lungs  Extensive hepatic metastatic disease  Osseous metastatic disease    I personally discussed this study with Dr Rashard Vega on 1/6/2020 at 1:02 PM  Workstation performed: [id number]     Ct Abdomen Pelvis With Contrast    Result Date: 1/2/2020  Narrative: CT ABDOMEN AND PELVIS WITH IV CONTRAST INDICATION:   Nausea/vomiting  History of breast cancer  COMPARISON:  CT scan 5/22/2015  TECHNIQUE:  CT examination of the abdomen and pelvis was performed  Axial, sagittal, and coronal 2D reformatted images were created from the source data and submitted for interpretation  Radiation dose length product (DLP) for this visit:  597 mGy-cm   This examination, like all CT scans performed in the [facility], was performed utilizing techniques to minimize radiation dose exposure, including the use of iterative reconstruction and automated exposure control  IV Contrast:  100 mL of iodixanol (VISIPAQUE) Enteric Contrast:  Enteric contrast was not administered  FINDINGS: ABDOMEN LOWER CHEST:  No clinically significant abnormality identified in the visualized lower chest  LIVER/BILIARY TREE:  Innumerable hypodensities have developed during the interval consistent with metastatic disease the largest lesion in the right lobe measures approximately 4 8 x 3 8 cm and the largest lesion in the left lobe measures 2 4 x 1 6 cm  GALLBLADDER:  No calcified gallstones  No pericholecystic inflammatory change  SPLEEN:  Unremarkable  PANCREAS:  Unremarkable  ADRENAL GLANDS:  Unremarkable  KIDNEYS/URETERS:  Unremarkable  No hydronephrosis  STOMACH AND BOWEL:  Unremarkable  APPENDIX:  A normal appendix was visualized  ABDOMINOPELVIC CAVITY:  Moderate abdominopelvic ascites  VESSELS:  Unremarkable for patient's age  PELVIS REPRODUCTIVE ORGANS:  Unremarkable for patient's age  URINARY BLADDER:  Unremarkable  ABDOMINAL WALL/INGUINAL REGIONS:  Unremarkable  OSSEOUS STRUCTURES:  No acute fracture or destructive osseous lesion  Impression: Innumerable hepatic lesions have developed in the interval consistent with metastatic disease  Moderate abdominal pelvic ascites   Workstation performed: [id number]         Labs:   Lab Results   Component Value Date    WBC 2 57 (L) 01/13/2020    HGB 13 8 01/13/2020    HCT 39 8 01/13/2020    MCV 98 01/13/2020    PLT 23 (LL) 01/13/2020     Lab Results   Component Value Date     01/31/2015    K 4 0 01/13/2020    CL 97 (L) 01/13/2020    CO2 20 (L) 01/13/2020    ANIONGAP 6 01/31/2015    BUN 22 01/13/2020    CREATININE 1 48 (H) 01/13/2020    GLUCOSE 90 01/31/2015    GLUF 117 (H) 01/03/2020    CALCIUM 8 1 (L) 01/13/2020    CORRECTEDCA 11 8 (H) 01/02/2020     (H) 01/13/2020    ALT 77 01/13/2020    ALKPHOS 240 (H) 01/13/2020    PROT 7 4 01/31/2015    BILITOT 0 4 01/31/2015    EGFR 37 01/13/2020         Current Medications: Reviewed  Allergies: Reviewed  PMH/FH/SH:  Reviewed      Vital Sign:    Body surface area is 1 74 meters squared      Wt Readings from Last 3 Encounters:   01/14/20 70 3 kg (155 lb)   01/02/20 59 5 kg (131 lb 1 6 oz)   01/03/20 59 4 kg (131 lb)        Temp Readings from Last 3 Encounters:   01/14/20 97 8 °F (36 6 °C) (Tympanic Core)   01/14/20 97 7 °F (36 5 °C)   01/09/20 98 1 °F (36 7 °C) (Oral)        BP Readings from Last 3 Encounters:   01/14/20 122/78   01/14/20 140/80   01/09/20 121/75         Pulse Readings from Last 3 Encounters:   01/14/20 (!) 124   01/14/20 (!) 118   01/09/20 94     @LASTSAO2(3)@

## 2020-01-17 NOTE — TELEPHONE ENCOUNTER
Left  for patient's  to let him know that I sent the completed paperwork he brought in earlier this week to the fax number/email he provided  I asked that patient call the office back if he has any questions or if he would like a copy of the paperwork sent to his email on file

## 2020-01-17 NOTE — TELEPHONE ENCOUNTER
Stated that [facility] is not in network with the pt's insurance so they will not be going out to see her      FYI

## 2020-01-17 NOTE — TELEPHONE ENCOUNTER
Noted  Will place another lab order for next week to recheck counts  Dr Crescencio Duncan does not need patient to get transfusion tomorrow  He would like CBC completed on Monday  Spoke with patient's  to make him aware  He states that it is becoming difficult for him to get the patient to the lab and asked if [facility] can draw labs  I spoke with [facility] and they will draw labs  CBC script faxed to them

## 2020-01-17 NOTE — TELEPHONE ENCOUNTER
Spoke with patient's  to discuss  Patient uses [facility] on [employer]  I spoke with them to get fax number and sent lab script over to them   Fax number [phone]

## 2020-01-20 NOTE — TELEPHONE ENCOUNTER
MD Roxie Sarkar, YUE             Can you set up Platelet transfusion 1u, tomorrow? Her Plt is 21   She normally comes to [facility]  Thanks  Patient schedule for 1u platelet at [facility] at 1000  Patient spouse Jamieson Sher aware and agreeable

## 2020-01-20 NOTE — TELEPHONE ENCOUNTER
Call transferred form AGUSTO Agarwal from lab reporting critical platelet [phone]  Lab repeated with read back  Yoselin Cardona RN  RN contacted via IM and tasked

## 2020-01-21 PROBLEM — G89.3 CANCER RELATED PAIN: Status: ACTIVE | Noted: 2020-01-01

## 2020-01-21 PROBLEM — Z51.5 COMFORT MEASURES ONLY STATUS: Status: ACTIVE | Noted: 2020-01-01

## 2020-01-21 PROBLEM — G92.9 ENCEPHALOPATHY, TOXIC: Status: ACTIVE | Noted: 2020-01-01

## 2020-01-21 NOTE — TELEPHONE ENCOUNTER
Dereje Monge called in stated that the [facility] doesn't except patient insurance, and advised that [facility] is in net work with patient insurance and gave us the telephone for admission [phone]   If you have any questions a good call back number for Dereje Monge is [phone]

## 2020-01-22 NOTE — ASSESSMENT & PLAN NOTE
· Primary breast cancer with metastasis to liver  She is noted to have increased LFTs, T bili, ammonia levels  · This is contributing to her increasing pain as well as intermittent agitation  · Does not want any aggressive measures  · I verified from the patient's family regarding goals of care:  Comfort measures and hospice care  At that time that I saw the patient, patient was lethargic  · Hospice care consult

## 2020-01-22 NOTE — PROGRESS NOTES
Progress Note - Elbert Irwin 1955, 62 y o  female MRN: [id number]    Unit/Bed#: S -01 Encounter: [id number]    Primary Care Provider: Meño Roberts DO   Date and time admitted to hospital: 1/21/2020  9:29 PM        Liver metastasis [facility])  Assessment & Plan  · Primary breast cancer with metastasis to liver  She is noted to have increased LFTs, T bili, ammonia levels  · This is contributing to her increasing pain as well as intermittent agitation  · Does not want any aggressive measures  · I verified from the patient's family regarding goals of care:  Comfort measures and hospice care  At that time that I saw the patient, patient was lethargic  · Hospice care consult  Cancer related pain  Assessment & Plan  · Discontinued home oxycodone, prescribed morphine p r n  Kamilah Serum · Comfort measures and hospice care  Comfort measures only status  Assessment & Plan  · Patient as well as  at bedside are awaiting hospice placement, and only want comfort measures at this time  · Discontinued all home medications, ordered morphine and Ativan p r n  Kamilah Serum Scopolamine started for excess secretions  · Comfort measures only, hospice care evaluations  * Encephalopathy, toxic  Assessment & Plan  · Secondary to progressing liver disease as well as cancer related pain  · Patient and family did not want aggressive measures  · Morphine and Ativan p r n  Kamilah Serum · Comfort measures only  · Hospice consult  VTE Pharmacologic Prophylaxis:   Pharmacologic: Pharmacologic VTE Prophylaxis contraindicated due to End of life comfort  Mechanical VTE Prophylaxis in Place: No    Patient Centered Rounds: I have performed bedside rounds with nursing staff today  Discussions with Specialists or Other Care Team Provider:  Case management  Education and Discussions with Family / Patient:  Patient's /family at bedside    I verified from him their goals of care:  Comfort measures and hospice care    Time Spent for Care: 30 minutes  More than 50% of total time spent on counseling and coordination of care as described above  Current Length of Stay: 0 day(s)    Current Patient Status: Observation   Certification Statement: The patient will continue to require additional inpatient hospital stay due to Above findings and plans  Discharge Plan:  None yet today  Code Status: Level 4 - Comfort Care      Subjective: When I saw the patient earlier today, patient was either very sound asleep or lethargic  Thus patient was not waking up from sleep as she is on comfort measures  Objective:     Vitals:   Temp (24hrs), Av 6 °F (36 4 °C), Min:97 6 °F (36 4 °C), Max:97 6 °F (36 4 °C)    Temp:  [97 6 °F (36 4 °C)] 97 6 °F (36 4 °C)  HR:  [105-108] 108  Resp:  [18-32] 32  BP: (127-129)/(59-60) 127/60  SpO2:  [91 %-97 %] 97 %  Body mass index is 28 24 kg/m²  Input and Output Summary (last 24 hours):     No intake or output data in the 24 hours ending 20 1805    Physical Exam:     Physical Exam     Patient is either sound asleep or lethargic  Patient was we can up from sleep and not examined as patient is on comfort measures and hospice care  Additional Data:     Labs:    Results from last 7 days   Lab Units 20  1349   WBC Thousand/uL 3 17*   HEMOGLOBIN g/dL 15 1   HEMATOCRIT % 43 3   PLATELETS Thousands/uL 21*   LYMPHO PCT % 9*   MONO PCT % 8   EOS PCT % 0     Results from last 7 days   Lab Units 20  1349   POTASSIUM mmol/L 5 0   CHLORIDE mmol/L 94*   CO2 mmol/L 21   BUN mg/dL 37*   CREATININE mg/dL 1 94*   CALCIUM mg/dL 7 1*   ALK PHOS U/L 242*   ALT U/L 77   AST U/L 175*           * I Have Reviewed All Lab Data Listed Above  * Additional Pertinent Lab Tests Reviewed: Holly 66 Admission Reviewed    Imaging:    Imaging Reports Reviewed Today Include:  Diagnostic imaging studies that were done on this admission    Imaging Personally Reviewed by Myself Includes: None     Recent Cultures (last 7 days):           Last 24 Hours Medication List:     Current Facility-Administered Medications:  glycopyrrolate 0 2 mg Intravenous Q1H PRN Carmelo Schumacher PA-C   LORazepam 0 5 mg Intravenous Q1H PRN Carmelo Schumacher PA-C   morphine injection 2 mg Intravenous Q10 Min PRN Carmelo Schumacher PA-C   scopolamine 1 patch Transdermal Q72H Holden Arana MD        Today, Patient Was Seen By: Holden Arana MD    ** Please Note: Dragon 360 Dictation voice to text software may have been used in the creation of this document   **

## 2020-01-22 NOTE — UTILIZATION REVIEW
Initial Clinical Review    Admission: Date/Time/Statement: [email or web address]  Orders Placed This Encounter   Procedures    Place in Observation     Standing Status:   Standing     Number of Occurrences:   1     Order Specific Question:   Admitting Physician     Answer:   Luis Balderrama [[id number]]     Order Specific Question:   Level of Care     Answer:   Med Surg [16]     ED Arrival Information     Expected Arrival Acuity Means of Arrival Escorted By Service Admission Type    1/21/2020 1/21/2020 21:29 Emergent [facility]ulance [facility] EMS Hospitalist Emergency    [address]        Chief Complaint   Patient presents with    Altered Mental Status     Per  patient has declined in mental status for the past 2 weeks  Patient has HX breat cancers 10+ years ago, diagnosed with liver cancer this past fall  Patient is jaundice, no chemotherapy  Reola Don at bedside  Assessment/Plan: 61 yo female to ED by ems admitted observation d/t   * Encephalopathy, toxic  Assessment & Plan  · Secondary to progressing liver disease as well as cancer related pain  · Patient and family did not want aggressive measures at this time  · Morphine and Ativan p r n  Reece Piotr · Comfort measures only  · Hospice consult      Comfort measures only status  Assessment & Plan  · Patient as well as  at bedside are awaiting hospice placement, and only want comfort measures at this time  · Discontinue all home medications, will order morphine and Ativan p r n  Reece Bruch · Comfort measures only, palliative care and hospice evaluations      Cancer related pain  Assessment & Plan  · Discontinue home oxycodone, will prescribe morphine p r n        Liver metastasis ([facility] [address] )  Assessment & Plan  · Primary breast cancer with metastasis to liver  She is noted to have increased LFTs, T bili, ammonia levels  · This is contributing to her increasing pain as well as intermittent agitation    · Does not want any aggressive measures at this time         ED Triage Vitals   Temperature Pulse Respirations Blood Pressure SpO2   01/21/20 2200 01/21/20 2137 01/21/20 2137 01/21/20 2137 01/21/20 2137   97 6 °F (36 4 °C) 105 18 129/59 91 %      Temp Source Heart Rate Source Patient Position - Orthostatic VS BP Location FiO2 (%)   01/21/20 2200 01/21/20 2137 01/21/20 2137 01/21/20 2137 --   Oral Monitor Lying Left arm       Pain Score       01/21/20 2137       No Pain        Wt Readings from Last 1 Encounters:   01/21/20 72 3 kg (159 lb 6 3 oz)     Additional Vital Signs:   01/21/20 2200  97 6 °F (36 4 °C)  108Abnormal   18  127/60  97 %  None (Room air)  Lying   01/21/20 2137  --  105  18  129/59  91 %  None (Room air)  Lying       Pertinent Labs/Diagnostic Test Results:   Results from last 7 days   Lab Units 01/20/20  1349 01/17/20  1147   WBC Thousand/uL 3 17* 3 05*   HEMOGLOBIN g/dL 15 1 15 0   HEMATOCRIT % 43 3 43 5   PLATELETS Thousands/uL 21* 32*   BANDS PCT % 1 1         Results from last 7 days   Lab Units 01/20/20  1349 01/17/20  1147   SODIUM mmol/L 127* 128*   POTASSIUM mmol/L 5 0 4 3   CHLORIDE mmol/L 94* 97*   CO2 mmol/L 21 21   ANION GAP mmol/L 12 10   BUN mg/dL 37* 25   CREATININE mg/dL 1 94* 1 47*   EGFR ml/min/1 73sq m 27 38   CALCIUM mg/dL 7 1* 8 2*     Results from last 7 days   Lab Units 01/20/20  1349 01/17/20  1147   AST U/L 175* 170*   ALT U/L 77 68   ALK PHOS U/L 242* 243*   TOTAL PROTEIN g/dL 4 9* 5 2*   ALBUMIN g/dL 1 4* 1 5*   TOTAL BILIRUBIN mg/dL 17 24* 17 05*         Results from last 7 days   Lab Units 01/20/20  1349 01/17/20  1147   GLUCOSE RANDOM mg/dL 91 107       Results from last 7 days   Lab Units 01/20/20  1349 01/17/20  1147   TOTAL COUNTED  100 100       ED Treatment:   Medication Administration from 01/21/2020 2129 to 01/21/2020 2244       Date/Time Order Dose Route Action  Comments                01/21/2020 2233 morphine (PF) 4 mg/mL injection 4 mg 4 mg Intramuscular Given          Past Medical History:   Diagnosis Date  Breast CA ([facility] [address] )     Elevated serum creatinine 1/2/2020    GERD (gastroesophageal reflux disease)     Hyperlipidemia     Hypokalemia 1/2/2020    Hypokalemia 1/2/2020     Present on Admission:   Liver metastasis ([facility] [address] )   Encephalopathy, toxic   Cancer related pain      Admitting Diagnosis: Altered mental status [R41 82]  Weakness [R53 1]  Jaundice [R17]  Liver metastasis (HCC) [C78 7]  Age/Sex: 62 y o  female  Admission Orders:  Scheduled Medications:  PRN Meds:    glycopyrrolate 0 2 mg Intravenous Q1H PRN   LORazepam 0 5 mg Intravenous Q1H PRN 1/21 x 1, 1/22 x 1   morphine injection 2 mg Intravenous Q10 Min PRN     Reg diet  Oral care    IP CONSULT TO PALLIATIVE CARE  IP CONSULT TO HOSPICE    Network Utilization Review Department  [email or web address]  ATTENTION: Please call with any questions or concerns to [phone] and carefully listen to the prompts so that you are directed to the right person  All voicemails are confidential   Anusha Miramontes all requests for admission clinical reviews, approved or denied determinations and any other requests to dedicated fax number below belonging to the campus where the patient is receiving treatment   List of dedicated fax numbers for the Facilities:  [address] DENIALS (Administrative/Medical Necessity) [phone]   [address]  [facility] (Maternity/NICU/Pediatrics) [phone]   Jya Courser [phone]   Danis Uribe [phone]   [facility] [phone]   [facility] [phone]   [address] [facility] [address] [facility] [phone]   [facility]  [phone]   [address] [facility] [address]  [address] [facility] [address] [facility] [phone]

## 2020-01-22 NOTE — ED PROVIDER NOTES
History  Chief Complaint   Patient presents with    Altered Mental Status     Per  patient has declined in mental status for the past 2 weeks  Patient has HX breat cancers 10+ years ago, diagnosed with liver cancer this past fall  Patient is jaundice, no chemotherapy  Ivan Rubinstein at bedside  HPI     Patient is a 30-year-old female who presents emergency department via EMS for evaluation  Patient with recent diagnosis of liver cancer  She is not currently undergoing any treatment  Goals are hospice however patient unable to be placed on hospice home with this time and has an appointment coming up on Thursday  She has been increasingly confused and difficult to take care of at home  She is a DNR/DNI and only wishes for comfort measures  Prior to Admission Medications   Prescriptions Last Dose Informant Patient Reported? Taking?   atorvastatin (LIPITOR) 10 mg tablet  Self Yes No   Sig: Take by mouth   busPIRone (BUSPAR) 15 mg tablet  Self Yes No   Sig: Take 15 mg by mouth daily   famotidine (PEPCID) 10 mg tablet   Yes No   Sig: Take 40 mg by mouth 2 (two) times a day as needed for heartburn    furosemide (LASIX) 20 mg tablet   No No   Sig: Take 1 tablet (20 mg total) by mouth daily   metoclopramide (REGLAN) 10 mg tablet   No No   Sig: Take 1 tablet (10 mg total) by mouth 4 (four) times a day as needed (chemo induced nausea/vomiting)   ondansetron (ZOFRAN) 4 mg tablet   No No   Sig: Take 1 tablet (4 mg total) by mouth every 8 (eight) hours as needed for nausea or vomiting   oxyCODONE (ROXICODONE) 5 mg immediate release tablet   No No   Sig: Take 2 tablets (10 mg total) by mouth every 6 (six) hours as needed for moderate painMax Daily Amount: 40 mg   palbociclib (IBRANCE) 100 MG capsule   No No   Sig: Take 1 capsule (100 mg total) by mouth daily Take with food   Take one tablet by mouth 3 weeks on, followed by 1 week off   potassium chloride (K-DUR,KLOR-CON) 10 mEq tablet   No No   Sig: Take 1 tablet (10 mEq total) by mouth daily   sodium chloride 1 g tablet   No No   Sig: Take 1 tablet (1 g total) by mouth 3 (three) times a day with meals      Facility-Administered Medications: None       Past Medical History:   Diagnosis Date    Breast CA ([address] )     Elevated serum creatinine 1/2/2020    GERD (gastroesophageal reflux disease)     Hyperlipidemia     Hypokalemia 1/2/2020    Hypokalemia 1/2/2020       Past Surgical History:   Procedure Laterality Date    BREAST BIOPSY      MASTECTOMY Bilateral     RECONSTRUCTION BREAST W/ TRAM FLAP Right        Family History   Problem Relation Age of Onset    Hypertension Mother     Diabetes Mother     Heart disease Mother     Diabetes Father     Heart disease Father     Colon cancer Sister     Hypertension Sister      I have reviewed and agree with the history as documented  Social History     Tobacco Use    Smoking status: Never Smoker    Smokeless tobacco: Never Used   Substance Use Topics    Alcohol use: Not Currently     Comment: social    Drug use: No        Review of Systems   Unable to perform ROS: Mental status change       Physical Exam  Physical Exam   Constitutional: She appears toxic  She appears ill  HENT:   Head: Normocephalic and atraumatic  Eyes: Scleral icterus is present  Jaundice   Cardiovascular:   Tachycardia   Pulmonary/Chest: Effort normal  No respiratory distress  Abdominal: She exhibits distension  Neurological: GCS eye subscore is 4  GCS verbal subscore is 5  GCS motor subscore is 6  Somnolent but arousable   Skin: Capillary refill takes 2 to 3 seconds  Psychiatric: Her mood appears not anxious  She is not agitated  Nursing note and vitals reviewed        Vital Signs  ED Triage Vitals [01/21/20 2137]   Temp Pulse Respirations Blood Pressure SpO2   -- 105 18 129/59 91 %      Temp src Heart Rate Source Patient Position - Orthostatic VS BP Location FiO2 (%)   -- Monitor Lying Left arm --      Pain Score       No Pain           Vitals:    01/21/20 2137   BP: 129/59   Pulse: 105   Patient Position - Orthostatic VS: Lying         Visual Acuity      ED Medications  Medications   morphine (PF) 4 mg/mL injection 4 mg (4 mg Intramuscular Given 1/21/20 2233)       Diagnostic Studies  Results Reviewed     None                 No orders to display              Procedures  Procedures         ED Course                               MDM  Number of Diagnoses or Management Options  Altered mental status: established and worsening  Jaundice: established and worsening  Diagnosis management comments: Will admit for comfort measures  Patient's  at bedside, states no aggressive measures  Simply wishes for patient to be comfortable  Does not want labs, antibiotics, imaging or workup for altered mental status  Understands severe underlying disease  Had been wanting outpatient placement to a hospice house but was unable to arrange prior to worsening mental status and inability to care for patient at home  Morphine given in ER and patient admitted for comfort care  Amount and/or Complexity of Data Reviewed  Review and summarize past medical records: yes  Discuss the patient with other providers: yes    Risk of Complications, Morbidity, and/or Mortality  Presenting problems: moderate  Diagnostic procedures: moderate  Management options: moderate    Patient Progress  Patient progress: improved        Disposition  Final diagnoses:    Altered mental status   Jaundice     Time reflects when diagnosis was documented in both MDM as applicable and the Disposition within this note     Time User Action Codes Description Comment    1/21/2020 10:13 PM Minda Scott Add [R41 82] Altered mental status     1/21/2020 10:13 PM Minda Scott Add [R17] Jaundice     1/21/2020 10:34 PM Adelakaty Flowers Add [C78 7] Liver metastasis [facility])       ED Disposition     ED Disposition Condition Date/Time Comment    Admit Stable Tue Jan 21, 2020 10:13 PM Case was discussed with RACHEL and the patient's admission status was agreed to be Admission Status: observation status to the service of Dr Marina Bauer   Follow-up Information    None         Patient's Medications   Discharge Prescriptions    No medications on file     No discharge procedures on file      ED Provider  Electronically Signed by           Kirill Núñez MD  01/21/20 4434

## 2020-01-22 NOTE — SOCIAL WORK
CM spoke with patient's  Jaradsilva Vences at the bedside  CM introduced self and role  Patient's  updated CM sent a referral to [facility]  [address] is unable to accept due to her insurance  Patient's  stated he did speak with [address] at home  Patient and  had an appointment to speak with a hospice liaison at home tomorrow 1/23/2020  Patient's  requesting [facility] for hospice  Patient's  is interested in inpatient hospice   updated CM spoke with a [facility] representative re:hospice referral  MAMADOU waiting for call from [id number]  [facility] to discuss inpatient hospice   provided cell phone number [phone]  All questions answered at this time   CM waiting for [facility] call from [address]  to discuss referral

## 2020-01-22 NOTE — SOCIAL WORK
CM spoke with patient's  at bedside  Patient's  aware CM spoke with Jered Rouse from [facility] hospice   updated [facility] hospice unable to evaluate patient at the bedside today  Plan of care is for evaluation tomorrow morning   aware that CM provided his contact information for any questions/concerns  CM will f/u in the am with [facility] hospice/

## 2020-01-22 NOTE — ASSESSMENT & PLAN NOTE
· Discontinued home oxycodone, prescribed morphine p r n  Justo Calero · Comfort measures and hospice care

## 2020-01-22 NOTE — SOCIAL WORK
CM received a call from [address], supervisor with [facility] hospice  CM updated [address] with patient's  plan of care/preference  [address] is going to verify if a liaison is available to come to the bedside for an evaluation  [address] is also checking the [facility] bed availability  Waiting for callback from [address]

## 2020-01-22 NOTE — ASSESSMENT & PLAN NOTE
· Patient as well as  at bedside are awaiting hospice placement, and only want comfort measures at this time  · Discontinue all home medications, will order morphine and Ativan p r n  Nanci Lee · Comfort measures only, palliative care and hospice evaluations

## 2020-01-22 NOTE — H&P
H&P- Mitch Hernandez 1955, 62 y o  female MRN: [id number]  Unit/Bed#: S -01 Encounter: [id number]  Primary Care Provider: Judithann Merlin, DO   Date and time admitted to hospital: 1/21/2020  9:29 PM    * Encephalopathy, toxic  Assessment & Plan  · Secondary to progressing liver disease as well as cancer related pain  · Patient and family did not want aggressive measures at this time  · Morphine and Ativan p r n  Francella Crooked · Comfort measures only  · Hospice consult  Comfort measures only status  Assessment & Plan  · Patient as well as  at bedside are awaiting hospice placement, and only want comfort measures at this time  · Discontinue all home medications, will order morphine and Ativan p r n  Francella Crooked · Comfort measures only, palliative care and hospice evaluations  Cancer related pain  Assessment & Plan  · Discontinue home oxycodone, will prescribe morphine p r n       Liver metastasis ([address] )  Assessment & Plan  · Primary breast cancer with metastasis to liver  She is noted to have increased LFTs, T bili, ammonia levels  · This is contributing to her increasing pain as well as intermittent agitation  · Does not want any aggressive measures at this time  VTE Prophylaxis: comfort measures only  / reason for no mechanical VTE prophylaxis comfort measures only   Code Status: LEVEL 4 COMFORT CARE  POLST: POLST form is not discussed and not completed at this time  Discussion with family: patient and,  Katy Lambert    Anticipated Length of Stay:  Patient will be admitted on an Observation basis with an anticipated length of stay of  < 2 midnights  Justification for Hospital Stay:  Increasing encephalopathy, cancer related pain, desire for comfort measures only  Total Time for Visit, including Counseling / Coordination of Care: 1 hour  Greater than 50% of this total time spent on direct patient counseling and coordination of care      Chief Complaint:   Encephalopathy, all over pain    History of Present Illness:    Katrin Dietz is a 62 y o  female with a past medical history significant for metastatic breast cancer to liver who presents with increasing cancer related pain, intermittent agitation and encephalopathy  As an outpatient she has not desired any chemotherapy or aggressive measures  Her  is at bedside who also provides history  Her  reports that the pain has been worse for the patient over the past day  They deny wanting any aggressive treatment or measures at this time, rather want more comfort based approach  They are requesting hospice evaluation and only want the patient to be comfort care measures only  Review of Systems:    Review of Systems   Unable to perform ROS: Mental status change       Past Medical and Surgical History:     Past Medical History:   Diagnosis Date    Breast CA ([facility] [address] )     Elevated serum creatinine 1/2/2020    GERD (gastroesophageal reflux disease)     Hyperlipidemia     Hypokalemia 1/2/2020    Hypokalemia 1/2/2020       Past Surgical History:   Procedure Laterality Date    BREAST BIOPSY      MASTECTOMY Bilateral     RECONSTRUCTION BREAST W/ TRAM FLAP Right        Meds/Allergies:    Prior to Admission medications    Medication Sig Start Date End Date Taking?  Authorizing Provider   atorvastatin (LIPITOR) 10 mg tablet Take by mouth    Historical Provider, MD   busPIRone (BUSPAR) 15 mg tablet Take 15 mg by mouth daily 3/24/18   Historical Provider, MD   famotidine (PEPCID) 10 mg tablet Take 40 mg by mouth 2 (two) times a day as needed for heartburn     Historical Provider, MD   furosemide (LASIX) 20 mg tablet Take 1 tablet (20 mg total) by mouth daily 1/10/20   Abraham Ross MD   metoclopramide (REGLAN) 10 mg tablet Take 1 tablet (10 mg total) by mouth 4 (four) times a day as needed (chemo induced nausea/vomiting) 12/18/19   Deanna Buerger, MD   ondansetron (ZOFRAN) 4 mg tablet Take 1 tablet (4 mg total) by mouth every 8 (eight) hours as needed for nausea or vomiting 12/13/19   Shelley Ellis MD   oxyCODONE (ROXICODONE) 5 mg immediate release tablet Take 2 tablets (10 mg total) by mouth every 6 (six) hours as needed for moderate painMax Daily Amount: 40 mg 1/10/20   Shelley Ellis MD   palbociclib [facility]) 100 MG capsule Take 1 capsule (100 mg total) by mouth daily Take with food  Take one tablet by mouth 3 weeks on, followed by 1 week off 12/23/19   Shelley Ellis MD   potassium chloride (K-DUR,KLOR-CON) 10 mEq tablet Take 1 tablet (10 mEq total) by mouth daily 1/10/20   Caren Lang MD   sodium chloride 1 g tablet Take 1 tablet (1 g total) by mouth 3 (three) times a day with meals 1/9/20   Caren Lang MD     I have reviewed home medications with patient personally  Allergies:    Allergies   Allergen Reactions    Percocet [Oxycodone-Acetaminophen]     Vancomycin        Social History:     Marital Status: /Civil Union   Occupation: non-contributory  Patient Pre-hospital Living Situation: home with   Patient Pre-hospital Level of Mobility: not assessed  Patient Pre-hospital Diet Restrictions: none  Substance Use History:   Social History     Substance and Sexual Activity   Alcohol Use Not Currently    Comment: social     Social History     Tobacco Use   Smoking Status Never Smoker   Smokeless Tobacco Never Used     Social History     Substance and Sexual Activity   Drug Use No       Family History:    Family History   Problem Relation Age of Onset    Hypertension Mother     Diabetes Mother     Heart disease Mother     Diabetes Father     Heart disease Father     Colon cancer Sister     Hypertension Sister        Physical Exam:     Vitals:   Blood Pressure: 127/60 (01/21/20 2200)  Pulse: (!) 108 (01/21/20 2200)  Temperature: 97 6 °F (36 4 °C) (01/21/20 2200)  Temp Source: Oral (01/21/20 2200)  Respirations: 18 (01/21/20 2200)  Weight - Scale: 72 3 kg (159 lb 6 3 oz) (01/21/20 2137)  SpO2: 97 % (01/21/20 2200)    Physical Exam   Constitutional: She appears well-developed and well-nourished  She appears distressed  HENT:   Head: Normocephalic and atraumatic  Eyes: Pupils are equal, round, and reactive to light  EOM are normal  Scleral icterus is present  Neck: Neck supple  Cardiovascular: Normal rate, regular rhythm and normal heart sounds  No murmur heard  Pulmonary/Chest: Effort normal and breath sounds normal  No respiratory distress  She has no wheezes  She has no rales  Abdominal: Soft  Bowel sounds are normal  She exhibits no distension  There is tenderness  There is no rebound and no guarding  Musculoskeletal: She exhibits no deformity  Neurological: She is alert  Skin: Skin is warm and dry  Capillary refill takes less than 2 seconds  No rash noted  She is not diaphoretic  No erythema  No pallor  + jaundice   Psychiatric: Her mood appears anxious  She is agitated  Nursing note and vitals reviewed  Additional Data:     Lab Results: I have personally reviewed pertinent reports  Results from last 7 days   Lab Units 01/20/20  1349   WBC Thousand/uL 3 17*   HEMOGLOBIN g/dL 15 1   HEMATOCRIT % 43 3   PLATELETS Thousands/uL 21*   BANDS PCT % 1   LYMPHO PCT % 9*   MONO PCT % 8   EOS PCT % 0     Results from last 7 days   Lab Units 01/20/20  1349   SODIUM mmol/L 127*   POTASSIUM mmol/L 5 0   CHLORIDE mmol/L 94*   CO2 mmol/L 21   BUN mg/dL 37*   CREATININE mg/dL 1 94*   ANION GAP mmol/L 12   CALCIUM mg/dL 7 1*   ALBUMIN g/dL 1 4*   TOTAL BILIRUBIN mg/dL 17 24*   ALK PHOS U/L 242*   ALT U/L 77   AST U/L 175*   GLUCOSE RANDOM mg/dL 91                       Imaging: none    No orders to display       EKG, Pathology, and Other Studies Reviewed on Admission:   · Prior pertinent studies and records reviewed in [employer] Records Reviewed: Yes     ** Please Note: This note has been constructed using a voice recognition system   **

## 2020-01-22 NOTE — PLAN OF CARE
Problem: Prexisting or High Potential for Compromised Skin Integrity  Goal: Skin integrity is maintained or improved  Description  INTERVENTIONS:  - Identify patients at risk for skin breakdown  - Assess and monitor skin integrity  - Assess and monitor nutrition and hydration status  - Monitor labs   - Assess for incontinence   - Turn and reposition patient  - Assist with mobility/ambulation  - Relieve pressure over bony prominences  - Avoid friction and shearing  - Provide appropriate hygiene as needed including keeping skin clean and dry  - Evaluate need for skin moisturizer/barrier cream  - Collaborate with interdisciplinary team   - Patient/family teaching  - Consider wound care consult   Outcome: Progressing     Problem: Potential for Falls  Goal: Patient will remain free of falls  Description  INTERVENTIONS:  - Assess patient frequently for physical needs  -  Identify cognitive and physical deficits and behaviors that affect risk of falls    -  Victor fall precautions as indicated by assessment   - Educate patient/family on patient safety including physical limitations  - Instruct patient to call for assistance with activity based on assessment  - Modify environment to reduce risk of injury  - Consider OT/PT consult to assist with strengthening/mobility  Outcome: Progressing     Problem: NEUROSENSORY - ADULT  Goal: Remains free of injury related to seizures activity  Description  INTERVENTIONS  - Maintain airway, patient safety  and administer oxygen as ordered  - Monitor patient for seizure activity, document and report duration and description of seizure to physician/advanced practitioner  - If seizure occurs,  ensure patient safety during seizure  - Instruct patient/family to call for assistance with activity based on nursing assessment       Outcome: Progressing

## 2020-01-22 NOTE — ASSESSMENT & PLAN NOTE
· Secondary to progressing liver disease as well as cancer related pain  · Patient and family did not want aggressive measures at this time  · Morphine and Ativan p r n  Irl Pizza · Comfort measures only  · Hospice consult

## 2020-01-22 NOTE — ASSESSMENT & PLAN NOTE
· Secondary to progressing liver disease as well as cancer related pain  · Patient and family did not want aggressive measures  · Morphine and Ativan p r n  Samaria Ada · Comfort measures only  · Hospice consult

## 2020-01-22 NOTE — SOCIAL WORK
CM received call from [address] at [address] [facility] stated [facility] does not have open beds at this time  [address] stated, "there is no available staff member to assess patient today " CM requested [facility] hospice liaison to evaluate patient in the morning  [address] stated they are able to evaluate patient tomorrow  CM provided patient's  name and contact number on the phone  [address] will update patient's  with plan of care

## 2020-01-22 NOTE — DEATH NOTE
INPATIENT DEATH NOTE  Lore Essex 62 y o  female MRN: [id number]  Unit/Bed#: S -01 Encounter: [id number]    Date, Time and Cause of Death    Date of Death:  20  Time of Death:   6:40 PM  Preliminary Cause of Death:  Metastatic breast cancer ([facility] [address] )  Entered by:  Robbie Andrade MD[AP1 1]     Attribution     AP1  Jadiel Camacho MD 20 18:52                PHYSICAL EXAM:  Unresponsive to noxious stimuli, Spontaneous respirations absent, Breath sounds absent, Carotid pulse absent, Heart sounds absent, Pupillary light reflex absent and Corneal blink reflex absent    Medical Examiner notification criteria:  Patient  within 24 hours of arrival to hospital   Medical Examiner's office notified?:  Yes (patient's nurse notified the medical examiner)  Medical Examiner accepted case?:  No  Name of Medical Examiner: Flo Mcgarry         Autopsy Options:  Post-mortem examination declined by next of kin    Primary Service Attending Physician notified?:  yes - Attending:  Luz Schmitt MD    Physician/Resident responsible for completing Discharge Summary:  Dr Robbie Andrade

## 2020-01-22 NOTE — ASSESSMENT & PLAN NOTE
· Primary breast cancer with metastasis to liver  She is noted to have increased LFTs, T bili, ammonia levels  · This is contributing to her increasing pain as well as intermittent agitation  · Does not want any aggressive measures at this time

## 2020-01-22 NOTE — ASSESSMENT & PLAN NOTE
· Patient as well as  at bedside are awaiting hospice placement, and only want comfort measures at this time  · Discontinued all home medications, ordered morphine and Ativan p r n  Ajay Rousurindere Scopolamine started for excess secretions  · Comfort measures only, hospice care evaluations

## 2020-01-23 NOTE — DISCHARGE SUMMARY
Discharge Summary - [facility]    Patient Information: Royer Nieves 62 y o  female MRN: [id number]  Unit/Bed#: S -01 Encounter: [id number]    Discharging Physician / Practitioner: Kelli Phan MD  PCP: Josie Unger DO  Admission Date: 2020  Discharge Date: 20    Reason for Admission:  Increasing Encephalopathy, generalized pains  Discharge Diagnoses:     Principal Problem:    Encephalopathy, toxic  Active Problems:    Liver metastasis (HCC)    Comfort measures only status    Cancer related pain  Hospice care patient  Cause of death:  Breast cancer with metastasis (with metastasis to liver and bone)  Consultations During Hospital Stay:  · Hospice care staff  Hospital Course:     Royer Nieves is a 62 y o  female patient who originally presented to the hospital on 2020 due to increasing encephalopathy and generalized pains  Patient has metastatic breast cancer with metastasis to the liver and bone  Goals of care discussion were made with the patient and patient's family and they opted for comfort measures only and hospice care  Thus patient was admitted for hospice care here  At 6:40 p m , 2020, patient was pronounced dead  I spoke to the patient's  at bedside and expressed our condolences  He does not want any autopsy  I will fill out patient's death certificate  Condition at Discharge: Patient      Discharge Day Visit / Exam:     Patient unresponsive  Fixed and dilated pupils  No spontaneous respirations  No heart beats noted  No pulses noted  Please see my death note  ** Please Note: Dragon 360 Dictation voice to text software may have been used in the creation of this document   **

## 2020-02-20 ENCOUNTER — HOSPITAL ENCOUNTER (OUTPATIENT)
Dept: INFUSION CENTER | Facility: CLINIC | Age: 65
End: 2020-02-20

## 2024-01-02 NOTE — PROGRESS NOTES
Hematology / Oncology Outpatient Follow Up Note    Jessica Gimenez 65 y o  female CONCEPCION:[birth year] [facility]:[id number]         Date:  7/27/2018    Assessment / Plan:  A 65year old postmenopausal woman with stage IIIA right breast cancer with invasive lobular histology, grade 2, ER/ND positive HER-2 negative disease  She underwent mastectomy resulting in CHAPARRO  She had 5 positive lymph nodes  She was treated with adjuvant chemotherapy with [facility] followed by paclitaxel  She was subsequently treated with 5 years of adjuvant hormonal therapy with tamoxifen, since she was premenopausal  In August 2016, her postmenopausal condition was confirmed  Since then, she has been on anastrozole with no significant side effects  clinically, she has no evidence recurrent disease  Regarding her rash, I have relatively low suspicion of skin metastasis  It does not appear to be cellulitis either  I recommended her to have observation  If rash is progressive and persistent for several weeks, she may see her surgeon for skin biopsy  She is in agreement with my recommendations      Subjective:      HPI:             Interval History:  A 61-year-old postmenopausal female with stage IIIA right breast cancer, grade 2, invasive lobular histology, ER/ND positive HER-2 negative disease  She underwent mastectomy with lymph node dissection followed by adjuvant chemotherapy with a c  followed by paclitaxel  She had 5 positive axillary lymph nodes at the time of surgery  Prior to the chemotherapy, she had regular menstrual cycle  She developed chemotherapy-induced menopause  She underwent adjuvant chemotherapy with AC , followed by paclitaxel  She was treated with 5 years of adjuvant hormonal therapy with tamoxifen  Her postmenopausal condition was confirmed in August 2016  Therefore, she is currently on anastrozole  She has no evidence recurrence to date    She presents today with new complaint minor rash and a medial side of the right reconstructed [[email or web address]] : Patient with known right soleal vein DVT.  Being monitored.  Reports no new events since last evaluation.  No further worsening of her swelling.  No pain in her right lower extremity other than at the hip level. breast, which she found 3 days ago  She has no itching or pain  She is afebrile  She has no respiratory symptoms  Her performance status is normal        Objective:      Primary Diagnosis:     Right breast cancer stage IIIA (pT2, pN2, M0) grade 2, ER/KY positive, HER-2 negative disease with invasive lobular carcinoma  5 positive axillary lymph nodes  Diagnosed in December 2010       Cancer Staging:  Cancer Staging  No matching staging information was found for the patient         Previous Hematologic/ Oncologic Treatment:      1  Adjuvant chemotherapy with dose-dense AC x4, followed by weekly paclitaxel x12   2  Adjuvant hormonal therapy with tamoxifen from July 2011 through August 2016      Current Hematologic/ Oncologic Treatment:       Adjuvant hormonal therapy with anastrozole since August 2016      Disease Status:      CHAPARRO status post mastectomy with axillary lymph node dissection      Test Results:     Pathology:           Radiology:     CT scan of chest in March 2017 showed no evidence of metastatic disease  DEXA scan in November 2016 showed a T score of -2 0, consistent with osteopenia  Chest x-ray in April 2018 was negative for pulmonary disease      Laboratory:     CA 27, 29 was 45 in April 2018      Physical Exam:        General Appearance:    Alert, oriented          Eyes:    PERRL   Ears:    Normal external ear canals, both ears   Nose:   Nares normal, septum midline   Throat:   Mucosa moist  Pharynx without injection  Neck:   Supple         Lungs:     Clear to auscultation bilaterally   Chest Wall:    No tenderness or deformity    Heart:    Regular rate and rhythm         Abdomen:     Soft, non-tender, bowel sounds +, no organomegaly               Extremities:   Extremities no cyanosis or edema         Skin:    very faint papular rash on the medial aspect of her right reconstructed breast   No warm feeling on the touch  No tenderness     Lymph nodes:   Cervical, supraclavicular, and axillary nodes normal   Neurologic:   CNII-XII intact, normal strength, sensation and reflexes     Throughout             Breast exam:   status post right mastectomy with TRAM flap reconstruction on the right  Status post left mastectomy without reconstruction  No palpable abnormality in her right reconstructed breast or left chest wall  ROS: Review of Systems   Skin:        Rash on right reconstructed breast    All other systems reviewed and are negative  Imaging: No results found  Labs:   Lab Results   Component Value Date    WBC 5 22 04/06/2018    HGB 12 9 04/06/2018    HCT 38 7 04/06/2018    MCV 85 04/06/2018     04/06/2018     Lab Results   Component Value Date     04/06/2018    K 3 8 04/06/2018     04/06/2018    CO2 28 04/06/2018    ANIONGAP 9 04/06/2018    BUN 24 04/06/2018    CREATININE 0 86 04/06/2018    GLUCOSE 90 01/31/2015    GLUF 93 04/06/2018    CALCIUM 8 9 04/06/2018    AST 26 04/06/2018    ALT 50 04/06/2018    ALKPHOS 103 04/06/2018    PROT 7 2 04/06/2018    BILITOT 0 60 04/06/2018    EGFR 73 04/06/2018       Current Medications: Reviewed  Allergies: Reviewed  PMH/FH/SH:  Reviewed      Vital Sign:    Body surface area is 1 65 meters squared      Wt Readings from Last 3 Encounters:   07/27/18 62 6 kg (138 lb)   05/18/18 61 9 kg (136 lb 6 4 oz)   03/16/18 60 5 kg (133 lb 7 oz)        Temp Readings from Last 3 Encounters:   07/27/18 98 1 °F (36 7 °C) (Tympanic)   05/18/18 98 1 °F (36 7 °C) (Tympanic)   05/19/17 98 6 °F (37 °C)        BP Readings from Last 3 Encounters:   07/27/18 138/84   05/18/18 120/78   11/04/17 138/80         Pulse Readings from Last 3 Encounters:   07/27/18 94   05/18/18 97   05/19/17 100     @LASTSAO2(3)@